# Patient Record
Sex: FEMALE | Race: WHITE
[De-identification: names, ages, dates, MRNs, and addresses within clinical notes are randomized per-mention and may not be internally consistent; named-entity substitution may affect disease eponyms.]

---

## 2019-07-30 ENCOUNTER — HOSPITAL ENCOUNTER (OUTPATIENT)
Dept: HOSPITAL 35 - CV | Age: 24
End: 2019-07-30
Attending: FAMILY MEDICINE
Payer: COMMERCIAL

## 2019-07-30 DIAGNOSIS — J45.990: Primary | ICD-10-CM

## 2019-08-14 ENCOUNTER — HOSPITAL ENCOUNTER (OUTPATIENT)
Dept: HOSPITAL 35 - ULTRA | Age: 24
End: 2019-08-14
Attending: FAMILY MEDICINE
Payer: COMMERCIAL

## 2019-08-14 ENCOUNTER — TRANSFERRED RECORDS (OUTPATIENT)
Dept: HEALTH INFORMATION MANAGEMENT | Facility: CLINIC | Age: 24
End: 2019-08-14

## 2019-08-14 DIAGNOSIS — E04.2: Primary | ICD-10-CM

## 2021-12-06 ENCOUNTER — LAB REQUISITION (OUTPATIENT)
Dept: LAB | Facility: CLINIC | Age: 26
End: 2021-12-06

## 2021-12-06 LAB — HBV SURFACE AB SERPL IA-ACNC: >1000 M[IU]/ML

## 2021-12-06 PROCEDURE — 86481 TB AG RESPONSE T-CELL SUSP: CPT | Performed by: INTERNAL MEDICINE

## 2021-12-06 PROCEDURE — 86765 RUBEOLA ANTIBODY: CPT | Performed by: INTERNAL MEDICINE

## 2021-12-06 PROCEDURE — 86735 MUMPS ANTIBODY: CPT | Performed by: INTERNAL MEDICINE

## 2021-12-06 PROCEDURE — 86762 RUBELLA ANTIBODY: CPT | Performed by: INTERNAL MEDICINE

## 2021-12-06 PROCEDURE — 86706 HEP B SURFACE ANTIBODY: CPT | Performed by: INTERNAL MEDICINE

## 2021-12-06 PROCEDURE — 86787 VARICELLA-ZOSTER ANTIBODY: CPT | Performed by: INTERNAL MEDICINE

## 2021-12-07 LAB
MEV IGG SER IA-ACNC: 173 AU/ML
MEV IGG SER IA-ACNC: POSITIVE
MUMPS ANTIBODY IGG INSTRUMENT VALUE: 41.7 AU/ML
MUV IGG SER QL IA: POSITIVE
RUBV IGG SERPL QL IA: 13 INDEX
RUBV IGG SERPL QL IA: POSITIVE
VZV IGG SER QL IA: 841.4 INDEX
VZV IGG SER QL IA: POSITIVE

## 2021-12-08 LAB
GAMMA INTERFERON BACKGROUND BLD IA-ACNC: 0 IU/ML
M TB IFN-G BLD-IMP: NEGATIVE
M TB IFN-G CD4+ BCKGRND COR BLD-ACNC: 10 IU/ML
MITOGEN IGNF BCKGRD COR BLD-ACNC: 0 IU/ML
MITOGEN IGNF BCKGRD COR BLD-ACNC: 0 IU/ML
QUANTIFERON MITOGEN: 10 IU/ML
QUANTIFERON NIL TUBE: 0 IU/ML
QUANTIFERON TB1 TUBE: 0 IU/ML
QUANTIFERON TB2 TUBE: 0

## 2022-04-03 ENCOUNTER — HEALTH MAINTENANCE LETTER (OUTPATIENT)
Age: 27
End: 2022-04-03

## 2022-06-12 ENCOUNTER — HOSPITAL ENCOUNTER (EMERGENCY)
Facility: CLINIC | Age: 27
Discharge: HOME | End: 2022-06-12
Admitting: PHYSICIAN ASSISTANT
Payer: COMMERCIAL

## 2022-06-12 VITALS
SYSTOLIC BLOOD PRESSURE: 133 MMHG | RESPIRATION RATE: 18 BRPM | HEART RATE: 84 BPM | DIASTOLIC BLOOD PRESSURE: 88 MMHG | WEIGHT: 180 LBS | TEMPERATURE: 97.6 F | BODY MASS INDEX: 28.93 KG/M2 | HEIGHT: 66 IN | OXYGEN SATURATION: 99 %

## 2022-06-12 DIAGNOSIS — S09.90XA MILD CLOSED HEAD INJURY, INITIAL ENCOUNTER: ICD-10-CM

## 2022-06-12 PROCEDURE — 99284 EMERGENCY DEPT VISIT MOD MDM: CPT | Performed by: PHYSICIAN ASSISTANT

## 2022-06-12 PROCEDURE — 96372 THER/PROPH/DIAG INJ SC/IM: CPT | Performed by: PHYSICIAN ASSISTANT

## 2022-06-12 PROCEDURE — 250N000011 HC RX IP 250 OP 636: Performed by: PHYSICIAN ASSISTANT

## 2022-06-12 RX ORDER — KETOROLAC TROMETHAMINE 15 MG/ML
15 INJECTION, SOLUTION INTRAMUSCULAR; INTRAVENOUS ONCE
Status: COMPLETED | OUTPATIENT
Start: 2022-06-12 | End: 2022-06-12

## 2022-06-12 RX ORDER — ONDANSETRON 4 MG/1
4 TABLET, ORALLY DISINTEGRATING ORAL ONCE
Status: COMPLETED | OUTPATIENT
Start: 2022-06-12 | End: 2022-06-12

## 2022-06-12 RX ORDER — ONDANSETRON 4 MG/1
4 TABLET, ORALLY DISINTEGRATING ORAL EVERY 8 HOURS PRN
Qty: 10 TABLET | Refills: 0 | Status: SHIPPED | OUTPATIENT
Start: 2022-06-12 | End: 2022-06-15

## 2022-06-12 RX ORDER — KETOROLAC TROMETHAMINE 15 MG/ML
15 INJECTION, SOLUTION INTRAMUSCULAR; INTRAVENOUS ONCE
Status: DISCONTINUED | OUTPATIENT
Start: 2022-06-12 | End: 2022-06-12 | Stop reason: CLARIF

## 2022-06-12 RX ADMIN — KETOROLAC TROMETHAMINE 15 MG: 15 INJECTION, SOLUTION INTRAMUSCULAR; INTRAVENOUS at 19:45

## 2022-06-12 RX ADMIN — ONDANSETRON 4 MG: 4 TABLET, ORALLY DISINTEGRATING ORAL at 19:43

## 2022-06-12 NOTE — Clinical Note
Zully Correa was seen and treated in our emergency department on 6/12/2022.  She may return to work on 06/13/2022.       If you have any questions or concerns, please don't hesitate to call.      Claudine Ivory PA-C

## 2022-06-12 NOTE — ED PROVIDER NOTES
"ED Provider Note  [facility]      History   No chief complaint on file.    HPI  Zully Correa is an otherwise-healthy 26 year old female who presents to the ED today reporting a head injury. ***    Past Medical History  No past medical history on file.  No past surgical history on file.  No current outpatient medications on file.    Not on File  Family History  No family history on file.  Social History       Past medical history, past surgical history, medications, allergies, family history, and social history were reviewed with the patient. No additional pertinent items.       Review of Systems  {Complete vs limited ROS:[id number]::\"A complete review of systems was performed with pertinent positives and negatives noted in the HPI, and all other systems negative.\"}    Physical Exam      Physical Exam  ***  ED Course      Procedures       {ED Course Selections:857062}              No results found for any visits on 06/12/22.  Medications - No data to display     Assessments & Plan (with Medical Decision Making)   ***    I have reviewed the nursing notes. I have reviewed the findings, diagnosis, plan and need for follow up with the patient.    New Prescriptions    No medications on file       Final diagnoses:   None       --  ***  [facility] EMERGENCY DEPARTMENT  6/12/2022  "

## 2022-06-12 NOTE — ED TRIAGE NOTES
Pt ambulatory to triage. Has hit the right side of her head 2x in the last 24 hours. Once she bumped it on her car, then another on a kitchen counter. Has a hx of concussions/migraines. Comes in today as she has increased pain/pressure at the side, and nausea.

## 2022-06-13 NOTE — DISCHARGE INSTRUCTIONS
Here in the emergency department, have reassuring vital signs and exam findings.  You have a normal neurologic exam here, without any deficits.  We discussed that with your mechanism of injury, findings are not suspicious for any emergent cause of your headache.  I did offer obtaining a head CT scan, and with shared decision-making, we decided on holding off with your reassuring clinical appearance.  You were given Zofran and Toradol here which did improve your symptoms.  Paper prescription for Zofran was given to you, you can use this as needed.  I would recommend using Tylenol and ibuprofen at home.  I would expect your headache to improve within the next 1 to 2 days.  We discussed reasons to return including severe headache, vomiting, fever, any new or worsening symptoms.    Patient has no other questions or concerns at this time.  Red flag signs were addressed, and they were in agreement with the patient care plan provided.

## 2022-06-13 NOTE — ED PROVIDER NOTES
ED Provider Note  [facility]      History     Chief Complaint   Patient presents with     Head Injury     HPI  Zully Correa is a 26 year old female past medical history significant for migraine headaches who presents emergency department today with concern for right-sided headache.  Patient states that over the last 2 days she has had 2 head injuries.  The first head injury was on Friday night, when she was attempting to get into her vehicle.  She states that she hit the right temporal aspect of her scalp on the door frame while trying to get into her car.  This was without any associated changes in vision, vomiting, loss of consciousness, or any other injury.  She notes again on Saturday, she did hit that area again on her countertop when she accidentally sat up too quickly.  Again, no loss of consciousness, vomiting, or other injury.  She denies any open skin or bleeding at the time of these head injuries.  She states that since the initial injury on Friday she has been dealing with some headache localized to the area where she hit her head, this is associated with some nausea.  She denies any associated fevers, chest pain shortness of breath abdominal pain, vomiting or diarrhea.  No concern for pregnancy, last normal history.  About 3 weeks ago.    Patient does have a history of migraines for which she takes Excedrin as needed.  She describes this current headache slightly different from her migraines.  This is not the worst headache of her life, she states this is about a 6 out of 10.  She denies any associated changes in vision today, numbness or tingling of her extremities.  She has been taking ibuprofen, most recently this morning for headache.    Patient states that she did call into work today due to the headache.  She states that her parents prompted her to come in for evaluation this evening.    Past Medical History  No past medical history on file.  No past surgical  "history on file.  ondansetron (ZOFRAN ODT) 4 MG ODT tab      No Known Allergies  Family History  No family history on file.  Social History       Past medical history, past surgical history, medications, allergies, family history, and social history were reviewed with the patient. No additional pertinent items.       Review of Systems  A complete review of systems was performed with pertinent positives and negatives noted in the HPI, and all other systems negative.    Physical Exam   BP: 133/88  Pulse: 84  Temp: 97.6  F (36.4  C)  Resp: 18  Height: 167.6 cm (5' 6\")  Weight: 81.6 kg (180 lb)  SpO2: 99 %  Physical Exam    GENERAL APPEARANCE: The patient is well developed, well appearing, and in no acute distress.  HEAD:  Normocephalic and atraumatic.  No shipley sign or raccoon eyes.  Examination of the scalp reveals no palpable hematoma, no open skin, or other findings to suggest trauma.  EENT: No scleral icterus.  No conjunctival injection is noted.    NECK: Trachea is midline.No lymphadenopathy or tenderness.  No midline cervical tenderness noted.  LUNGS: Breath sounds are equal and clear bilaterally. No wheezes, rhonchi, or rales.  HEART: Regular rate and normal rhythm.  Radial pulses 2+ bilaterally.  ABDOMEN: Soft, flat, and benign. No mass, tenderness, guarding, or rebound.Bowel sounds are present.  EXTREMITIES: No cyanosis, clubbing, or edema.  NEUROLOGIC: No focal sensory or motor deficits are noted.  Cranial nerves II through XII intact.  Rapid altering movements, finger-nose testing intact.   strength 5 out of 5 bilaterally.  Resisted plantar flexion dorsiflexion 5 out of 5 bilaterally.  PSYCHIATRIC: The patient is awake, alert.  Appropriate mood and affect.  SKIN: Warm, dry, and well perfused. Good turgor.  MUSCULOSKELETAL: Patient has no tenderness to palpation at midline of cervical thoracic and lumbar spine.  She has full range of motion of the neck with flexion extension and lateral rotation to each " side.  She reports some mild discomfort in the vicinity of the paraspinal muscles of the cervical spine with lateral bending only.    ED Course           No results found for any visits on 06/12/22.  Medications   ondansetron (ZOFRAN ODT) ODT tab 4 mg (4 mg Oral Given 6/12/22 1943)   ketorolac (TORADOL) injection 15 mg (15 mg Intramuscular Given 6/12/22 1945)        Assessments & Plan (with Medical Decision Making)   This is a 26-year-old female with history of migraines presenting to the ED with concerns for right-sided headache with history of 2 recent minor head injuries over the last few days.  On presentation patient has stable vitals no tachycardia tachypnea or hypoxia here.  She has no deficits noted with neurologic exam.  She has no cervical midline tenderness, or any other findings to suggest other traumatic injury.  The description of her head injuries are both low-energy mechanisms.  I discussed with her that with her physical exam findings, her description of the headache, and known minor head injuries, symptoms not consistent with acute intracranial process.  Patient has no fever, she has no other meningeal findings.  She denies any shortness of breath or chest pain to raise suspicion for other cardiopulmonary process.  We discussed obtaining neuroimaging today, and I did give her the option of obtaining a head CT.  Patient would like to hold off, with shared decision making.  Did recommend trying to address the headache here in the emergency department, patient given 15 Toradol IM as well as 4 Zofran p.o.    On recheck, patient does report significant improvement of her nausea, states that her neck discomfort also seems to be improved.  She continues to have some headache.  I did discuss with her recommendations for Tylenol and ibuprofen at home, Zofran as needed, I did send a prescription to her preferred pharmacy.  Patient was supposed to work tonight, I gave her a work note excusing her from work  tonight.  Discussed with her my expectations that symptoms should be improving, tomorrow, and if she does develop any new or worsening symptoms she returns right away to ER for further evaluation and management.  Patient has no other questions or concerns at this time.  Red flag signs were addressed, and they were in agreement with the patient care plan provided.    I have reviewed the nursing notes. I have reviewed the findings, diagnosis, plan and need for follow up with the patient.    New Prescriptions    ONDANSETRON (ZOFRAN ODT) 4 MG ODT TAB    Take 1 tablet (4 mg) by mouth every 8 hours as needed for nausea       Final diagnoses:   Mild closed head injury, initial encounter       --    [facility] EMERGENCY DEPARTMENT  6/12/2022

## 2022-07-07 ENCOUNTER — LAB (OUTPATIENT)
Dept: LAB | Facility: CLINIC | Age: 27
End: 2022-07-07
Payer: COMMERCIAL

## 2022-07-07 ENCOUNTER — OFFICE VISIT (OUTPATIENT)
Dept: FAMILY MEDICINE | Facility: CLINIC | Age: 27
End: 2022-07-07
Payer: COMMERCIAL

## 2022-07-07 VITALS
TEMPERATURE: 98.1 F | HEIGHT: 66 IN | WEIGHT: 202.8 LBS | RESPIRATION RATE: 12 BRPM | SYSTOLIC BLOOD PRESSURE: 141 MMHG | OXYGEN SATURATION: 98 % | BODY MASS INDEX: 32.59 KG/M2 | DIASTOLIC BLOOD PRESSURE: 88 MMHG | HEART RATE: 79 BPM

## 2022-07-07 DIAGNOSIS — R03.0 ELEVATED BLOOD PRESSURE READING WITHOUT DIAGNOSIS OF HYPERTENSION: ICD-10-CM

## 2022-07-07 DIAGNOSIS — Z00.00 ROUTINE HISTORY AND PHYSICAL EXAMINATION OF ADULT: Primary | ICD-10-CM

## 2022-07-07 DIAGNOSIS — G43.109 MIGRAINE WITH AURA AND WITHOUT STATUS MIGRAINOSUS, NOT INTRACTABLE: ICD-10-CM

## 2022-07-07 DIAGNOSIS — Z00.00 ROUTINE HISTORY AND PHYSICAL EXAMINATION OF ADULT: ICD-10-CM

## 2022-07-07 DIAGNOSIS — Z86.39 HISTORY OF HYPERTHYROIDISM: ICD-10-CM

## 2022-07-07 LAB
CHOLEST SERPL-MCNC: 148 MG/DL
FASTING STATUS PATIENT QL REPORTED: YES
FASTING STATUS PATIENT QL REPORTED: YES
GLUCOSE BLD-MCNC: 97 MG/DL (ref 70–99)
HDLC SERPL-MCNC: 74 MG/DL
LDLC SERPL CALC-MCNC: 62 MG/DL
NONHDLC SERPL-MCNC: 74 MG/DL
T4 FREE SERPL-MCNC: 1.21 NG/DL (ref 0.76–1.46)
TRIGL SERPL-MCNC: 58 MG/DL
TSH SERPL DL<=0.005 MIU/L-ACNC: 0.07 MU/L (ref 0.4–4)

## 2022-07-07 PROCEDURE — 99385 PREV VISIT NEW AGE 18-39: CPT | Performed by: NURSE PRACTITIONER

## 2022-07-07 PROCEDURE — 36415 COLL VENOUS BLD VENIPUNCTURE: CPT | Performed by: PATHOLOGY

## 2022-07-07 PROCEDURE — 80061 LIPID PANEL: CPT | Performed by: PATHOLOGY

## 2022-07-07 PROCEDURE — 99000 SPECIMEN HANDLING OFFICE-LAB: CPT | Performed by: PATHOLOGY

## 2022-07-07 PROCEDURE — 84443 ASSAY THYROID STIM HORMONE: CPT | Performed by: PATHOLOGY

## 2022-07-07 PROCEDURE — 84439 ASSAY OF FREE THYROXINE: CPT | Performed by: PATHOLOGY

## 2022-07-07 PROCEDURE — 84480 ASSAY TRIIODOTHYRONINE (T3): CPT | Mod: 90 | Performed by: PATHOLOGY

## 2022-07-07 PROCEDURE — 82947 ASSAY GLUCOSE BLOOD QUANT: CPT | Performed by: PATHOLOGY

## 2022-07-07 RX ORDER — SUMATRIPTAN 50 MG/1
50 TABLET, FILM COATED ORAL
Qty: 30 TABLET | Refills: 3 | Status: SHIPPED | OUTPATIENT
Start: 2022-07-07 | End: 2023-10-30

## 2022-07-07 RX ORDER — PROPRANOLOL HYDROCHLORIDE 40 MG/1
40 TABLET ORAL DAILY
Qty: 90 TABLET | Refills: 3 | Status: SHIPPED | OUTPATIENT
Start: 2022-07-07 | End: 2023-10-30

## 2022-07-07 ASSESSMENT — ANXIETY QUESTIONNAIRES
1. FEELING NERVOUS, ANXIOUS, OR ON EDGE: SEVERAL DAYS
7. FEELING AFRAID AS IF SOMETHING AWFUL MIGHT HAPPEN: NOT AT ALL
GAD7 TOTAL SCORE: 3
2. NOT BEING ABLE TO STOP OR CONTROL WORRYING: NOT AT ALL
6. BECOMING EASILY ANNOYED OR IRRITABLE: NOT AT ALL
7. FEELING AFRAID AS IF SOMETHING AWFUL MIGHT HAPPEN: NOT AT ALL
GAD7 TOTAL SCORE: 3
8. IF YOU CHECKED OFF ANY PROBLEMS, HOW DIFFICULT HAVE THESE MADE IT FOR YOU TO DO YOUR WORK, TAKE CARE OF THINGS AT HOME, OR GET ALONG WITH OTHER PEOPLE?: NOT DIFFICULT AT ALL
GAD7 TOTAL SCORE: 3
4. TROUBLE RELAXING: SEVERAL DAYS
5. BEING SO RESTLESS THAT IT IS HARD TO SIT STILL: NOT AT ALL
3. WORRYING TOO MUCH ABOUT DIFFERENT THINGS: SEVERAL DAYS

## 2022-07-07 ASSESSMENT — PAIN SCALES - GENERAL: PAINLEVEL: NO PAIN (0)

## 2022-07-07 ASSESSMENT — PATIENT HEALTH QUESTIONNAIRE - PHQ9: SUM OF ALL RESPONSES TO PHQ QUESTIONS 1-9: 3

## 2022-07-07 NOTE — PROGRESS NOTES
ANNUAL WELLNESS EXAM     Today's Date: Jul 7, 2022     Patient Zully Correa 1995 presents to the clinic today for a preventative health visit.         SUBJECTIVE     History of Present Illness:    Zully presents to the clinic today for a routine health exam. She feels in an overall good state of health. She would like to discuss her migraine headaches today. Has had a history of migraine headaches since a teenager. She currently gets 1-2 headaches a week that can last several hours to a full day. She endorses photophobia and fatigue. States that she sometimes will get an aura with her migraines. Will take Excedrin for relief, but admits it does not always provide relief. Other measures for relief consist of resting in a dark environment and cool washcloths to her forehead.   In terms of health maintenance, she verbally expresses being up to date with immunizations and pap. Last pap was 1-2 years ago and WNL. She relocated last year from [place] to [place]. 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   No chest pain, dyspnea, abdominal pain, fevers, unintentional weight loss, dental concerns, or visual disturbances. Blood pressure in clinic was elevated today. Zully admits to just getting off a 12 hour night shift at the hospital where she consumed a lot of caffeine and has not eaten since last night prior to her shift. No history of elevated blood pressures in the past.      No Known Allergies     No current outpatient medications    Past Medical History:   Diagnosis Date     Hyperthyroidism      Intractable chronic migraine without aura      Peripheral autonomic neuropathy in disorders classified elsewhere      Uncomplicated asthma       Family History   Problem Relation Age of Onset     Asthma Mother      Thyroid Disease Mother         thyroid cancer s/p thyroidectomy     Aneurysm  Maternal Grandmother      Diabetes Maternal Grandfather      Cerebrovascular Disease Paternal Grandmother      Cerebrovascular Disease Paternal Grandfather      Unknown/Adopted No family hx of       Do you have a first-degree relative with a history of the following:  A. Cancer of the breast or ovaries - No   B. Heart attack, heart pain, or stroke before the age of 55 - No  C. Unexplained death from drowning or car accident - No  D. Osteoporosis or any other significant bone health concerns - No    Social History     Tobacco Use     Smoking status: Never Smoker     Smokeless tobacco: Never Used   Substance Use Topics     Alcohol use: Yes     Comment: Occasionally     Drug use: Never      History   Sexual Activity     Sexual activity: Yes     Partners: Male     Birth control/ protection: Condom       PHQ 2022   PHQ-9 Total Score 3   Q9: Thoughts of better off dead/self-harm past 2 weeks Not at all      Immunization History   Administered Date(s) Administered     COVID-19,PF,Pfizer 12+ Yrs ( and After) 2022      Health Maintenance Due   Topic Date Due     PREVENTIVE CARE VISIT  Never done     CHLAMYDIA SCREENING  Never done     ADVANCE CARE PLANNING  Never done     DTAP/TDAP/TD IMMUNIZATION (1 - Tdap) Never done     HPV IMMUNIZATION (1 - 2-dose series) Never done     HIV SCREENING  Never done     HEPATITIS C SCREENING  Never done     PAP  Never done     COVID-19 Vaccine (2 - Pfizer series) 2022      Health Maintenance components reviewed - Seasonal Influenza vaccine status is up to date & Covid-19 vaccine status is up to date.    Diet/Exercise: No concerns addressed    LMP of , regular cycles.  0 Para 0. The current method of family planning is condoms.     Review Of Systems:  Skin: negative  Eyes: negative  Ears/Nose/Throat: negative  Respiratory: No shortness of breath, dyspnea on exertion, cough, or hemoptysis  Cardiovascular: negative for, chest pain and lower extremity  "edema  Gastrointestinal: negative  Genitourinary: negative  Musculoskeletal: negative  Neurologic: negative  Psychiatric: negative  Hematologic/Lymphatic/Immunologic: negative  Endocrine: negative         OBJECTIVE     BP (!) 141/88 (BP Location: Right arm, Patient Position: Sitting, Cuff Size: Adult Regular)   Pulse 79   Temp 98.1  F (36.7  C) (Oral)   Resp 12   Ht 1.676 m (5' 6\")   Wt 92 kg (202 lb 12.8 oz)   LMP 06/19/2022 (Exact Date)   SpO2 98%   BMI 32.73 kg/m       Estimated body mass index is 32.73 kg/m  as calculated from the following:    Height as of this encounter: 1.676 m (5' 6\").    Weight as of this encounter: 92 kg (202 lb 12.8 oz).    Physical Exam:  Constitutional: Awake, alert, cooperative, no apparent distress, and appears stated age.  Eyes: Lids and lashes normal, pupils equal, round and reactive to light, extra ocular muscles intact, sclera clear, conjunctiva normal.  ENT: Normocephalic, without obvious abnormality, atramatic, external ears without lesions, oral pharynx with moist mucus membranes, tonsils without erythema or exudates, gums normal and good dentition.  Neck: Supple, symmetrical, trachea midline, no adenopathy, thyroid symmetric, not enlarged and no tenderness, skin normal.  Hematologic / Lymphatic: No cervical lymphadenopathy and no supraclavicular lymphadenopathy.  Lungs: No increased work of breathing, good air exchange, clear to auscultation bilaterally, no crackles or wheezing.  Cardiovascular: Regular rate and rhythm, normal S1 and S2, no S3 or S4, and no murmur noted.  Abdomen: No scars, normal bowel sounds, soft, non-distended, non-tender, no masses palpated, no hepatosplenomegally.  Musculoskeletal: No redness, warmth, or swelling of the joints. FROM noted x4 extremities.  Neurologic: Awake, alert, oriented to name, place and time. Cranial nerves II-XII are grossly intact. Gait is normal.  Neuropsychiatric: Normal affect, mood, orientation, memory and " insight.  Skin: No visible rashes, erythema, pallor, petechia or purpura.         ASSESSMENT/PLAN     (Z00.00) Routine history and physical examination of adult  (primary encounter diagnosis)  Physical exam unremarkable. Screening completed per USPSTF recommendations and per patient's own risk factors. Will check TSH given history of hyperthyroidism.  Plan: Lipid panel reflex to direct LDL Fasting,         Glucose, TSH with free T4 reflex    (G43.109) Migraine with aura and without status migrainosus, not intractable  Given frequency of migraine, feel that patient will benefit from abortive and prophylactic medication management. Patient can continue to use Excedrin as needed or interchangeably with sumatriptan. Neuro exam unremarkable. S/sx not c/w intracranial process. No other acute findings or red flags in migraine symptomatology.   Plan: SUMAtriptan (IMITREX) 50 MG tablet, propranolol        (INDERAL) 40 MG tablet    (R03.0) Elevated blood pressure reading without diagnosis of hypertension  - Likely related to situational factors including recent caffeine intake, fatigue, dehydration, and physical stress  - No chest pain, severe headache, vision changes, or dyspnea      -Discussed/Reinforced healthy diety, lifestyle, exercise and safety.  -Recommended completion of routine dental and eye exam.  -Lab screenings completed today. Results pending.     Follow-Up:  Follow up in one year, or sooner if needed.     Patient engaged in their plan of care. Patient verbalized understanding and agreed with the final plan.  AVS printed and given to patient.    ALLI Cintron CNP   [facility]  [address] [place] [id number] [phone]

## 2022-07-08 LAB — T3 SERPL-MCNC: 101 NG/DL (ref 85–202)

## 2022-07-14 ENCOUNTER — VIRTUAL VISIT (OUTPATIENT)
Dept: FAMILY MEDICINE | Facility: CLINIC | Age: 27
End: 2022-07-14
Payer: COMMERCIAL

## 2022-07-14 DIAGNOSIS — E05.90 SUBCLINICAL HYPERTHYROIDISM: Primary | ICD-10-CM

## 2022-07-14 NOTE — PROGRESS NOTES
Zully is a 26 year old who is being evaluated via a billable telephone visit.      What phone number would you like to be contacted at? [phone]  How would you like to obtain your AVS? Carlos Enriquez   Zully is a 26 year old, presenting for the following health issues:  No chief complaint on file.      HPI     26-year-old female with PMH migraines, hyperthyroidism presents for telephone follow-up to discuss last week's lab results.  Patient's TSH was depressed at 0.07, her T4 Free and T3 total were normal.  When she previously had hyperthyroidism, her symptoms included heat intolerance, anxiety, weight loss.  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 She denies diarrhea. No other acute concerns/symptoms at time of exam.      Review of Systems   Constitutional, HEENT, cardiovascular, pulmonary, gi and gu systems are negative, except as otherwise noted.          Objective           Vitals:  No vitals were obtained today due to virtual visit.    Physical Exam   healthy, alert and no distress  PSYCH: Alert and oriented times 3; coherent speech, normal   rate and volume, able to articulate logical thoughts, able   to abstract reason, no tangential thoughts, no hallucinations   or delusions  Her affect is normal  RESP: No cough, no audible wheezing, able to talk in full sentences  Remainder of exam unable to be completed due to telephone visits    Recent Results (from the past 168 hour(s))   Lipid panel reflex to direct LDL Fasting    Collection Time: 07/07/22  6:00 PM   Result Value Ref Range    Cholesterol 148 <200 mg/dL    Triglycerides 58 <150 mg/dL    Direct Measure HDL 74 >=50 mg/dL    LDL Cholesterol Calculated 62 <=100 mg/dL    Non HDL Cholesterol 74 <130 mg/dL    Patient Fasting > 8hrs? Yes    Glucose    Collection Time: 07/07/22  6:00 PM   Result Value Ref Range    Glucose 97 70 - 99 mg/dL     Patient Fasting > 8hrs? Yes    TSH with free T4 reflex    Collection Time: 07/07/22  6:00 PM   Result Value Ref Range    TSH 0.07 (L) 0.40 - 4.00 mU/L   T4 free    Collection Time: 07/07/22  6:00 PM   Result Value Ref Range    Free T4 1.21 0.76 - 1.46 ng/dL   T3 total    Collection Time: 07/07/22  6:00 PM   Result Value Ref Range    T3 Total 101 85 - 202 ng/dL     Assessment/Plan  1. Subclinical hyperthyroidism  Will check thyrotropin receptor antibody and refer to ENDO given symptomatic hyperthyroidism.  - Thyrotropin Receptor Antibody; Future  - Adult Endocrinology  Referral; Future    Follow-up as needed.    All questions/concerns addressed. Patient stated understanding/agreement to plan of care.    ALLI Hewitt, CNP  [facility] [employer]      Phone call duration: 5:40 minutes

## 2022-07-18 ENCOUNTER — VIRTUAL VISIT (OUTPATIENT)
Dept: ENDOCRINOLOGY | Facility: CLINIC | Age: 27
End: 2022-07-18
Payer: COMMERCIAL

## 2022-07-18 ENCOUNTER — TELEPHONE (OUTPATIENT)
Dept: ENDOCRINOLOGY | Facility: CLINIC | Age: 27
End: 2022-07-18

## 2022-07-18 ENCOUNTER — PRE VISIT (OUTPATIENT)
Dept: ENDOCRINOLOGY | Facility: CLINIC | Age: 27
End: 2022-07-18

## 2022-07-18 DIAGNOSIS — E05.90 SUBCLINICAL HYPERTHYROIDISM: ICD-10-CM

## 2022-07-18 PROCEDURE — 99203 OFFICE O/P NEW LOW 30 MIN: CPT | Mod: 95 | Performed by: INTERNAL MEDICINE

## 2022-07-18 NOTE — LETTER
7/18/2022       RE: Zully Correa  [address]  [place] [address]     Dear Colleague,    Thank you for referring your patient, Zully Correa, to the [facility] ENDOCRINOLOGY CLINIC [place] at [facility]. Please see a copy of my visit note below.    Endocrinology and Diabetes Clinic - New Consult     Zully Correa  is being evaluated via a billable video visit.      How would you like to obtain your AVS? [employer]  For the video visit, send the invitation by: Text to cell phone: [phone]  Will anyone else be joining your video visit? No    CC: Subclinical hyperthyroidism      Subjective:   Zully Correa is a 26 year old female seen today for a new consult. Due to the COVID-19 pandemic today's visit was completed virtually.     She has history of hyperthyroidism diagnosed a few years ago (2019?) in [place]. She is a runner and was experiencing exercise intolerance, thought she had asthma and went in for workup. Labs showed hyperthyroidism (unclear if this was subclinical or overt) and she was started on treatment with methimazole. She did have an uptake and scan and a thyroid ultrasound, which showed non-cancerous nodules, no biopsy recommended. Methimazole was continued for about a year, stopped because labs results had normalized. We do not currently have access to these records.     Symptoms went away after initial diagnosis and treatment, then slowly have come back.     Current symptoms:   Heat intolerance   Anxiety  Insomnia   Maybe some tremor   Heart racing   No eye symptoms   With initial diagnosis lost 10-15 lbs, since then has gained weight and now is stable despite efforts to lose weight   No compressive symptoms, with initial diagnosis she felt a mild goiter but not now     Mother had thyroid cancer and is s/p thyroidectomy. Aunts on both maternal and paternal sides have had thyroid disease (not cancer).  "    She takes propranolol for migraine prevention. No supplements and she does not take biotin.     She is not currently pregnant and does not have plans for pregnancy in the near future.       Current Outpatient Medications   Medication Instructions     propranolol (INDERAL) 40 mg, Oral, DAILY     SUMAtriptan (IMITREX) 50 mg, Oral, AT ONSET OF HEADACHE, May repeat in 2 hours. Max 4 tablets/24 hours.     Past Medical History:   Diagnosis Date     Hyperthyroidism      Intractable chronic migraine without aura      Peripheral autonomic neuropathy in disorders classified elsewhere      Uncomplicated asthma        No past surgical history on file.    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   She and her fiance are planning to get  this year.     Review of Systems: A comprehensive ROS was negative except as noted in HPI.     Physical Examination:  Estimated body mass index is 32.73 kg/m  as calculated from the following:    Height as of 7/7/22: 1.676 m (5' 6\").    Weight as of 7/7/22: 92 kg (202 lb 12.8 oz).    Wt Readings from Last 4 Encounters:   07/07/22 92 kg (202 lb 12.8 oz)   06/12/22 81.6 kg (180 lb)     GENERAL: Healthy, alert and no distress  EYES: Eyes grossly normal to inspection.  No discharge or erythema, or obvious scleral/conjunctival abnormalities.  NECK: No visible goiter or nodules   RESP: No audible wheeze, cough, or visible cyanosis.  Breathing and speaking comfortably. "   SKIN: Visible skin clear. No significant rash, abnormal pigmentation or lesions.  NEURO: Cranial nerves grossly intact.    PSYCH: Mentation appears normal, affect normal/bright, judgement and insight intact, normal speech and appearance well-groomed.    Labs and Studies:   ENDO THYROID LABS-[facility] Latest Ref Rng & Units 7/7/2022   TSH 0.40 - 4.00 mU/L 0.07 (L)   T3 85 - 202 ng/dL 101   FREE T4 0.76 - 1.46 ng/dL 1.21       Assessment:  Subclinical hyperthyroidism. Differential diagnosis includes Graves disease, multinodular goiter, or toxic adenoma.     Plan:   Repeat thyroid function labs and check TSI. There is an order to check thyrotropin receptor antibody already in place through her PCP.   Obtain a radioiodine uptake and scan.   Obtain records from her previous thyroid workup in [place], [place] (Dr. Shola Aquino, [facility]) for review, if possible.   I will contact her once results return. We reviewed treatment options today including methimazole, SOLORIO ablation, and thyroidectomy. To be determined based on results and her preference.     Follow up in clinic in 6 months.              Video-Visit Details    Type of service:  Video Visit    Video Start Time: 11:59 AM    Video End Time: 12:27 PM    Platform used for Video Visit: Sergo Martinez MD   Diabetes and Endocrinology   [phone]

## 2022-07-18 NOTE — TELEPHONE ENCOUNTER
FUTURE VISIT INFORMATION      FUTURE VISIT INFORMATION:    Date: 7/18/2022    Time: 12am    Location: [facility]  REFERRAL INFORMATION:    Referring provider:  Jemal CARSON CNP     Referring providers clinic:  [facility]     Reason for visit/diagnosis  Subclinical Hyperthyroidism    RECORDS REQUESTED FROM:       Clinic name Comments Records Status Imaging Status   Daniel Gilbert-7/14/2022 Epic No Images

## 2022-07-18 NOTE — PROGRESS NOTES
Endocrinology and Diabetes Clinic - New Consult     Zully Correa  is being evaluated via a billable video visit.      How would you like to obtain your AVS? [employer]  For the video visit, send the invitation by: Text to cell phone: [phone]  Will anyone else be joining your video visit? No    CC: Subclinical hyperthyroidism      Subjective:   Zully Correa is a 26 year old female seen today for a new consult. Due to the COVID-19 pandemic today's visit was completed virtually.     She has history of hyperthyroidism diagnosed a few years ago (2019?) in [place]. She is a runner and was experiencing exercise intolerance, thought she had asthma and went in for workup. Labs showed hyperthyroidism (unclear if this was subclinical or overt) and she was started on treatment with methimazole. She did have an uptake and scan and a thyroid ultrasound, which showed non-cancerous nodules, no biopsy recommended. Methimazole was continued for about a year, stopped because labs results had normalized. We do not currently have access to these records.     Symptoms went away after initial diagnosis and treatment, then slowly have come back.     Current symptoms:   Heat intolerance   Anxiety  Insomnia   Maybe some tremor   Heart racing   No eye symptoms   With initial diagnosis lost 10-15 lbs, since then has gained weight and now is stable despite efforts to lose weight   No compressive symptoms, with initial diagnosis she felt a mild goiter but not now     Mother had thyroid cancer and is s/p thyroidectomy. Aunts on both maternal and paternal sides have had thyroid disease (not cancer).     She takes propranolol for migraine prevention. No supplements and she does not take biotin.     She is not currently pregnant and does not have plans for pregnancy in the near future.       Current Outpatient Medications   Medication Instructions     propranolol (INDERAL) 40 mg, Oral, DAILY     SUMAtriptan (IMITREX) 50 mg, Oral,  "AT ONSET OF HEADACHE, May repeat in 2 hours. Max 4 tablets/24 hours.     Past Medical History:   Diagnosis Date     Hyperthyroidism      Intractable chronic migraine without aura      Peripheral autonomic neuropathy in disorders classified elsewhere      Uncomplicated asthma        No past surgical history on file.    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   She and her fiance are planning to get  this year.     Review of Systems: A comprehensive ROS was negative except as noted in HPI.     Physical Examination:  Estimated body mass index is 32.73 kg/m  as calculated from the following:    Height as of 7/7/22: 1.676 m (5' 6\").    Weight as of 7/7/22: 92 kg (202 lb 12.8 oz).    Wt Readings from Last 4 Encounters:   07/07/22 92 kg (202 lb 12.8 oz)   06/12/22 81.6 kg (180 lb)     GENERAL: Healthy, alert and no distress  EYES: Eyes grossly normal to inspection.  No discharge or erythema, or obvious scleral/conjunctival abnormalities.  NECK: No visible goiter or nodules   RESP: No audible wheeze, cough, or visible cyanosis.  Breathing and speaking comfortably.   SKIN: Visible skin clear. No significant rash, abnormal pigmentation or lesions.  NEURO: Cranial nerves grossly intact.    PSYCH: Mentation appears normal, affect normal/bright, judgement and insight intact, normal speech and appearance well-groomed.    Labs and Studies:   ENDO THYROID LABS-[facility] Latest Ref Rng & Units 7/7/2022   TSH " 0.40 - 4.00 mU/L 0.07 (L)   T3 85 - 202 ng/dL 101   FREE T4 0.76 - 1.46 ng/dL 1.21       Assessment:  Subclinical hyperthyroidism. Differential diagnosis includes Graves disease, multinodular goiter, or toxic adenoma.     Plan:   Repeat thyroid function labs and check TSI. There is an order to check thyrotropin receptor antibody already in place through her PCP.   Obtain a radioiodine uptake and scan.   Obtain records from her previous thyroid workup in [place], [place] (Dr. Shola Aquino, [facility]) for review, if possible.   I will contact her once results return. We reviewed treatment options today including methimazole, SOLORIO ablation, and thyroidectomy. To be determined based on results and her preference.     Follow up in clinic in 6 months.              Video-Visit Details    Type of service:  Video Visit    Video Start Time: 11:59 AM    Video End Time: 12:27 PM    Platform used for Video Visit: Sergo Martinez MD   Diabetes and Endocrinology   [phone]

## 2022-07-18 NOTE — PATIENT INSTRUCTIONS
Schedule a Nuclear Medicine thyroid uptake and scan.  Have labs done 1-2 days prior to the appointment.

## 2022-07-28 ENCOUNTER — TELEPHONE (OUTPATIENT)
Dept: ENDOCRINOLOGY | Facility: CLINIC | Age: 27
End: 2022-07-28

## 2022-07-28 NOTE — TELEPHONE ENCOUNTER
----- Message from Funmi Martinez MD sent at 7/28/2022 12:40 PM CDT -----  Regarding: RE: Appointment Not Available  Okay to use SIMRAN for this patient. Thank you!   Funmi     ----- Message -----  From: Michael Gilbert  Sent: 7/27/2022  11:35 AM CDT  To: Funmi Martinez MD  Subject: Appointment Not Available                        Dr. Martinez,    Per your check out orders from 07/18/2022, you requested that this pt be scheduled to see you around 01/18/2023 for a six month follow up. Unfortunately, there are no open slots in this timeframe - the only open slots in your January schedule are SIMRAN. Please advise when able.   ThanksMichael

## 2022-07-28 NOTE — TELEPHONE ENCOUNTER
JF 07/28/2022 for pt to c/b to schedule appointment with Dr. Martinez. SIMRAN approved per Dr. Martinez. There are two slots on 02/10/2023, one at 11:00 and one at 11:30 - either one can be used to schedule this pt. Will have to schedule manually as these slots will not appear in auto search.

## 2022-07-29 NOTE — TELEPHONE ENCOUNTER
[facility] 07/29/2022 for pt to c/b to schedule appointment with Dr. Martinez. SIMRAN approved per Dr. Martinez. There is one slot open on 02/10/2022 at 11:30, this slot can be used to schedule this pt, however pt will need to be scheduled manually as this slot will not appear in auto search.

## 2022-08-02 NOTE — TELEPHONE ENCOUNTER
JF and sent MyChart 8/2/22 for pt to c/b to schedule appointment with Dr. Martinez. SIMRAN approved per Dr. Martinez. Currently there is a SIMRAN open on 2/10/22 @ 11:30 am. Will need to schedule manually - will not populate w/ auto search.

## 2022-10-03 ENCOUNTER — HEALTH MAINTENANCE LETTER (OUTPATIENT)
Age: 27
End: 2022-10-03

## 2022-10-26 PROBLEM — Z00.00 ENCOUNTER FOR PREVENTIVE HEALTH EXAMINATION: Status: ACTIVE | Noted: 2022-10-26

## 2023-03-18 NOTE — NURSING NOTE
Chief Complaint   Patient presents with     Physical     Headache     Had them for a while starting to get the multiple times throughout the week. Causes nausea   AMENA Morris   Dr Reis

## 2023-08-13 ENCOUNTER — HEALTH MAINTENANCE LETTER (OUTPATIENT)
Age: 28
End: 2023-08-13

## 2023-10-30 ENCOUNTER — OFFICE VISIT (OUTPATIENT)
Dept: FAMILY MEDICINE | Facility: CLINIC | Age: 28
End: 2023-10-30
Payer: COMMERCIAL

## 2023-10-30 VITALS
HEIGHT: 66 IN | OXYGEN SATURATION: 99 % | HEART RATE: 92 BPM | TEMPERATURE: 97.5 F | BODY MASS INDEX: 33.27 KG/M2 | DIASTOLIC BLOOD PRESSURE: 80 MMHG | SYSTOLIC BLOOD PRESSURE: 129 MMHG | WEIGHT: 207 LBS | RESPIRATION RATE: 12 BRPM

## 2023-10-30 DIAGNOSIS — Z00.00 ROUTINE HISTORY AND PHYSICAL EXAMINATION OF ADULT: Primary | ICD-10-CM

## 2023-10-30 DIAGNOSIS — G43.709 CHRONIC MIGRAINE WITHOUT AURA WITHOUT STATUS MIGRAINOSUS, NOT INTRACTABLE: ICD-10-CM

## 2023-10-30 DIAGNOSIS — F39 MOOD DISORDER (H): ICD-10-CM

## 2023-10-30 DIAGNOSIS — E05.90 SUBCLINICAL HYPERTHYROIDISM: ICD-10-CM

## 2023-10-30 PROCEDURE — 99000 SPECIMEN HANDLING OFFICE-LAB: CPT | Performed by: NURSE PRACTITIONER

## 2023-10-30 PROCEDURE — 84480 ASSAY TRIIODOTHYRONINE (T3): CPT | Performed by: NURSE PRACTITIONER

## 2023-10-30 PROCEDURE — 83520 IMMUNOASSAY QUANT NOS NONAB: CPT | Mod: 90 | Performed by: NURSE PRACTITIONER

## 2023-10-30 PROCEDURE — 84443 ASSAY THYROID STIM HORMONE: CPT | Performed by: NURSE PRACTITIONER

## 2023-10-30 PROCEDURE — 84445 ASSAY OF TSI GLOBULIN: CPT | Mod: 90 | Performed by: NURSE PRACTITIONER

## 2023-10-30 PROCEDURE — 99395 PREV VISIT EST AGE 18-39: CPT | Performed by: NURSE PRACTITIONER

## 2023-10-30 PROCEDURE — 84439 ASSAY OF FREE THYROXINE: CPT | Performed by: NURSE PRACTITIONER

## 2023-10-30 PROCEDURE — 96127 BRIEF EMOTIONAL/BEHAV ASSMT: CPT | Performed by: NURSE PRACTITIONER

## 2023-10-30 PROCEDURE — 36415 COLL VENOUS BLD VENIPUNCTURE: CPT | Performed by: NURSE PRACTITIONER

## 2023-10-30 PROCEDURE — 99214 OFFICE O/P EST MOD 30 MIN: CPT | Mod: 25 | Performed by: NURSE PRACTITIONER

## 2023-10-30 RX ORDER — ONDANSETRON 4 MG/1
4 TABLET, FILM COATED ORAL EVERY 8 HOURS PRN
Qty: 30 TABLET | Refills: 1 | Status: SHIPPED | OUTPATIENT
Start: 2023-10-30

## 2023-10-30 RX ORDER — RIZATRIPTAN BENZOATE 10 MG/1
10 TABLET ORAL
Qty: 18 TABLET | Refills: 1 | Status: SHIPPED | OUTPATIENT
Start: 2023-10-30 | End: 2023-11-03

## 2023-10-30 RX ORDER — PROPRANOLOL HYDROCHLORIDE 40 MG/1
40 TABLET ORAL DAILY
Qty: 90 TABLET | Refills: 3 | Status: SHIPPED | OUTPATIENT
Start: 2023-10-30

## 2023-10-30 ASSESSMENT — ANXIETY QUESTIONNAIRES
3. WORRYING TOO MUCH ABOUT DIFFERENT THINGS: MORE THAN HALF THE DAYS
GAD7 TOTAL SCORE: 13
IF YOU CHECKED OFF ANY PROBLEMS ON THIS QUESTIONNAIRE, HOW DIFFICULT HAVE THESE PROBLEMS MADE IT FOR YOU TO DO YOUR WORK, TAKE CARE OF THINGS AT HOME, OR GET ALONG WITH OTHER PEOPLE: VERY DIFFICULT
7. FEELING AFRAID AS IF SOMETHING AWFUL MIGHT HAPPEN: MORE THAN HALF THE DAYS
GAD7 TOTAL SCORE: 13
1. FEELING NERVOUS, ANXIOUS, OR ON EDGE: MORE THAN HALF THE DAYS
2. NOT BEING ABLE TO STOP OR CONTROL WORRYING: MORE THAN HALF THE DAYS
5. BEING SO RESTLESS THAT IT IS HARD TO SIT STILL: MORE THAN HALF THE DAYS
6. BECOMING EASILY ANNOYED OR IRRITABLE: SEVERAL DAYS
4. TROUBLE RELAXING: MORE THAN HALF THE DAYS

## 2023-10-30 ASSESSMENT — ENCOUNTER SYMPTOMS
DYSURIA: 0
JOINT SWELLING: 0
FEVER: 0
WEAKNESS: 0
BREAST MASS: 0
DIZZINESS: 0
DIARRHEA: 0
ARTHRALGIAS: 1
PALPITATIONS: 1
CHILLS: 0
CONSTIPATION: 0
COUGH: 0
ABDOMINAL PAIN: 0
FREQUENCY: 0
MYALGIAS: 0
SORE THROAT: 0
PARESTHESIAS: 0
EYE PAIN: 0
HEARTBURN: 1
HEMATURIA: 0
HEADACHES: 1
SHORTNESS OF BREATH: 0
NAUSEA: 0
NERVOUS/ANXIOUS: 1
HEMATOCHEZIA: 0

## 2023-10-30 NOTE — PROGRESS NOTES
"   SUBJECTIVE:   CC: Zully is an 27 year old who presents for preventive health visit.         10/30/2023     3:32 PM   Additional Questions   Roomed by Jose byrd note:  \"She has history of hyperthyroidism diagnosed a few years ago (2019?) in [place]. She is a runner and was experiencing exercise intolerance, thought she had asthma and went in for workup. Labs showed hyperthyroidism (unclear if this was subclinical or overt) and she was started on treatment with methimazole. She did have an uptake and scan and a thyroid ultrasound, which showed non-cancerous nodules, no biopsy recommended. Methimazole was continued for about a year, stopped because labs results had normalized. We do not currently have access to these records\"  Today, she is present for re-evaluation of her TSH and to discuss anxiety: her current symptoms are   Heat intolerance, Anxiety-worsening, she feels always had anxiety, Fatigue; works night shifts, always feel tired, Heart racing, No eye symptoms, gained weight,   No compressive symptoms, with initial diagnosis she felt a mild goiter and now she feels a lump sensation again in throat. A \"egg in throat'; right side. Not painful. No gerd. No difficulty with swallowing. HO cyst to thyroid and that normalized after Methimazole.    Mother had thyroid cancer and is s/p thyroidectomy. Aunts on both maternal and paternal sides have had thyroid disease (not cancer).      She takes propranolol for migraine prevention. No supplements and she does not take biotin. She needs a refill. Can get nauseated with migraine. Pain behind eyes or bridge of nose. Was daily last month, now better. Can be around her period. Does not want to restart Pill.       She is not currently pregnant and does not have plans for pregnancy in the near future.     Has anxiety, this has been chronic. Would like to try something.     Healthy Habits:     Getting at least 3 servings of Calcium per day:  Yes    Bi-annual " eye exam:  NO    Dental care twice a year:  NO    Sleep apnea or symptoms of sleep apnea:  Daytime drowsiness    Diet:  Regular (no restrictions)    Frequency of exercise:  2-3 days/week    Duration of exercise:  30-45 minutes    Taking medications regularly:  Yes    Medication side effects:  Not applicable    Additional concerns today:  Yes  Anxiety  Associated symptoms include arthralgias and headaches. Pertinent negatives include no abdominal pain, chest pain, chills, congestion, coughing, fever, joint swelling, myalgias, nausea, rash, sore throat or weakness.   Headache   Associated symptoms include palpitations. Pertinent negatives include no fever, no shortness of breath and no nausea.       Today's PHQ-2 Score:       10/30/2023     3:12 PM   PHQ-2 ( 1999 [employer])   Q1: Little interest or pleasure in doing things 0   Q2: Feeling down, depressed or hopeless 1   PHQ-2 Score 1   Q1: Little interest or pleasure in doing things Not at all   Q2: Feeling down, depressed or hopeless Several days   PHQ-2 Score 1           7/7/2022     8:51 AM 10/13/2022     9:57 PM 10/30/2023     3:09 PM   MAXX-7 SCORE   Total Score 3 (minimal anxiety) 9 (mild anxiety) 13 (moderate anxiety)   Total Score 3 9    9    9 13       Have you ever done Advance Care Planning? (For example, a Health Directive, POLST, or a discussion with a medical provider or your loved ones about your wishes): No, advance care planning information given to patient to review.  Patient declined advance care planning discussion at this time.    Social History     Tobacco Use    Smoking status: Never    Smokeless tobacco: Never   Substance Use Topics    Alcohol use: Yes     Comment: Occasionally             10/30/2023     3:12 PM   Alcohol Use   Prescreen: >3 drinks/day or >7 drinks/week? No          No data to display              Reviewed orders with patient.  Reviewed health maintenance and updated orders accordingly - Yes  Lab work is in process  Labs reviewed  "in EPIC    Breast Cancer Screening:        10/30/2023     3:13 PM   Breast CA Risk Assessment (FHS-7)   Do you have a family history of breast, colon, or ovarian cancer? No / Unknown         Patient under 40 years of age: Routine Mammogram Screening not recommended.   Pertinent mammograms are reviewed under the imaging tab.    History of abnormal Pap smear: NO - age 21-29 PAP every 3 years recommended     Reviewed and updated as needed this visit by clinical staff   Tobacco  Allergies  Meds              Reviewed and updated as needed this visit by Provider                 Past Medical History:   Diagnosis Date    Hyperthyroidism     Intractable chronic migraine without aura     Peripheral autonomic neuropathy in disorders classified elsewhere     Uncomplicated asthma       No past surgical history on file.    Review of Systems   Constitutional:  Negative for chills and fever.   HENT:  Negative for congestion, ear pain, hearing loss and sore throat.    Eyes:  Negative for pain and visual disturbance.   Respiratory:  Negative for cough and shortness of breath.    Cardiovascular:  Positive for palpitations. Negative for chest pain and peripheral edema.   Gastrointestinal:  Positive for heartburn. Negative for abdominal pain, constipation, diarrhea, hematochezia and nausea.   Breasts:  Negative for tenderness, breast mass and discharge.   Genitourinary:  Negative for dysuria, frequency, genital sores, hematuria, pelvic pain, urgency, vaginal bleeding and vaginal discharge.   Musculoskeletal:  Positive for arthralgias. Negative for joint swelling and myalgias.   Skin:  Negative for rash.   Neurological:  Positive for headaches. Negative for dizziness, weakness and paresthesias.   Psychiatric/Behavioral:  Positive for mood changes. The patient is nervous/anxious.           OBJECTIVE:   /80   Pulse 92   Temp 97.5  F (36.4  C) (Oral)   Resp 12   Ht 1.664 m (5' 5.5\")   Wt 93.9 kg (207 lb)   LMP 10/05/2023 " (Exact Date)   SpO2 99%   BMI 33.92 kg/m    Physical Exam  GENERAL: healthy, alert and no distress  NECK: no adenopathy and thyroid nodule right side-  MS: no gross musculoskeletal defects noted, no edema  SKIN: no suspicious lesions or rashes  NEURO: Normal strength and tone, mentation intact and speech normal  PSYCH: mentation appears normal, affect normal/bright        ASSESSMENT/PLAN:       ICD-10-CM    1. Routine history and physical examination of adult  Z00.00       2. Subclinical hyperthyroidism  E05.90 Thyroid stimulating immunoglobulin     T3, total     Thyrotropin Receptor Antibody     T4, free     TSH     NM Thyroid Uptake and Scan     Thyroid stimulating immunoglobulin     T3, total     Thyrotropin Receptor Antibody     T4, free     TSH     Adult Endocrinology  Referral      3. Chronic migraine without aura without status migrainosus, not intractable  G43.709 propranolol (INDERAL) 40 MG tablet     ondansetron (ZOFRAN) 4 MG tablet     DISCONTINUED: rizatriptan (MAXALT) 10 MG tablet      4. Mood disorder (H24)  F39 sertraline (ZOLOFT) 50 MG tablet        - She never got the labs done by her last VV with Endo. I reordered them today, I told her she will probably need to follow up with Endo regarding the results. Other option can be an E-Consult. She will need to look into her insurance to assess how it is covered. I discussed that I typically do not manage these type of labs nor Hyperthyroid.     - Propranolol reordered since she feels this med is helpful. She does not want to split up the dose; such as 10 mg TID.     - Anxiety; multifactorial potentially with TSH levels, but it appears chronic. In a stressful job. Options discussed and she wants to go on a med that is more weight neutral. Zoloft ordered, and I discussed starting at 1/2 tablet for at least one week and can increase to 50 mg if not noticing any difference. I told her sometimes pts don't need more than a small amount. She  verbalized understanding.     Patient has been advised of split billing requirements and indicates understanding: Yes      COUNSELING:  Reviewed preventive health counseling, as reflected in patient instructions        She reports that she has never smoked. She has never used smokeless tobacco.          ALLI Lopez [facility]  Answers submitted by the patient for this visit:  MAXX-7 (Submitted on 10/30/2023)  MAXX 7 TOTAL SCORE: 13

## 2023-10-31 LAB
T3 SERPL-MCNC: 134 NG/DL (ref 85–202)
T4 FREE SERPL-MCNC: 1.48 NG/DL (ref 0.9–1.7)
TSH SERPL DL<=0.005 MIU/L-ACNC: 0.07 UIU/ML (ref 0.3–4.2)

## 2023-11-01 LAB — TSH RECEP AB SER-ACNC: <1.1 IU/L (ref 0–1.75)

## 2023-11-02 ENCOUNTER — TELEPHONE (OUTPATIENT)
Dept: FAMILY MEDICINE | Facility: CLINIC | Age: 28
End: 2023-11-02
Payer: COMMERCIAL

## 2023-11-02 PROBLEM — E05.90 SUBCLINICAL HYPERTHYROIDISM: Status: ACTIVE | Noted: 2023-11-02

## 2023-11-02 PROBLEM — G43.109 MIGRAINE WITH AURA AND WITHOUT STATUS MIGRAINOSUS, NOT INTRACTABLE: Status: ACTIVE | Noted: 2023-11-02

## 2023-11-02 LAB — TSI SER-ACNC: <1 TSI INDEX

## 2023-11-02 NOTE — TELEPHONE ENCOUNTER
"S-(situation):   Patients anxiety worsened after meds from appt on 10/30/2023    B-(background):   2-3 weeks ago started, anxiety so bad can't go to work  Trigger for this starting -work, upcoming move in a few months,  does not think she offloads stress well  Took medication last 2 days made so much worse, sertraline and propranolol  These meds have made it worse -\"Not herself\" exacerbated per     A-(assessment):   Patient did not make the phone call she was in background and gave permission for  to talk   was very emotional, had to stop and compose himself and is just wanting his wife to be ok.     R-(recommendations):      RN let patient know about how the medications work that were prescribed.    Recommended not taking them at same time. To help differentiate what medication is doing what.    RN let patient and  know that the RN triage line is open 24/7 at the clinic number.    RN made video appt for patient to have medication discussion and possible change tomorrow (patient preference).    FAIZAN Melgar  [facility]     "

## 2023-11-03 ENCOUNTER — VIRTUAL VISIT (OUTPATIENT)
Dept: FAMILY MEDICINE | Facility: CLINIC | Age: 28
End: 2023-11-03
Payer: COMMERCIAL

## 2023-11-03 DIAGNOSIS — F41.1 GAD (GENERALIZED ANXIETY DISORDER): ICD-10-CM

## 2023-11-03 DIAGNOSIS — G43.109 MIGRAINE WITH AURA AND WITHOUT STATUS MIGRAINOSUS, NOT INTRACTABLE: Primary | ICD-10-CM

## 2023-11-03 PROCEDURE — 99214 OFFICE O/P EST MOD 30 MIN: CPT | Mod: VID | Performed by: NURSE PRACTITIONER

## 2023-11-03 RX ORDER — HYDROXYZINE HYDROCHLORIDE 25 MG/1
25 TABLET, FILM COATED ORAL 3 TIMES DAILY PRN
Qty: 30 TABLET | Refills: 0 | Status: SHIPPED | OUTPATIENT
Start: 2023-11-03 | End: 2023-12-29

## 2023-11-03 RX ORDER — RIZATRIPTAN BENZOATE 10 MG/1
10 TABLET ORAL
Qty: 18 TABLET | Refills: 1 | Status: SHIPPED | OUTPATIENT
Start: 2023-11-03

## 2023-11-03 NOTE — PROGRESS NOTES
"Zully is a 27 year old who is being evaluated via a billable video visit.      How would you like to obtain your AVS? MyChart  If the video visit is dropped, the invitation should be resent by: Send to e-mail at: [email or web address]  Will anyone else be joining your video visit? No          Assessment & Plan     Migraine with aura and without status migrainosus, not intractable  Patient reports is not available at the pharmacy.  I am attempting to resend to see if it was not received.  Discussed may be a prior Auth issue.  Patient has previously tried Imitrex without success..    - rizatriptan (MAXALT) 10 MG tablet; Take 1 tablet (10 mg) by mouth at onset of headache for migraine (10 mg as a single dose; if symptoms persist or return, may repeat dose after =2 hours.) May repeat in 2 hours. Max 3 tablets/24 hours.    MAXX (generalized anxiety disorder)  Decrease sertraline as this may be contributing to anxiety. I recommend 25 mg (1/2 tab) for 2 weeks.    Also sending in hydroxyzine for break through anxiety. Discussed this may make drowsy.  Do half dose 12.5 mg if significantly drowsy from 25.  Can take 25 to 50 mg if not too drowsy.    - hydrOXYzine (ATARAX) 25 MG tablet; Take 1 tablet (25 mg) by mouth 3 times daily as needed for anxiety             BMI:   Estimated body mass index is 33.92 kg/m  as calculated from the following:    Height as of 10/30/23: 1.664 m (5' 5.5\").    Weight as of 10/30/23: 93.9 kg (207 lb).           ALLI ARAGON CNP  [facility]    Subjective   Zully is a 27 year old, presenting for the following health issues:  Recheck Medication and Mental Health Problem (Follow up )      11/3/2023     9:48 AM   Additional Questions   Roomed by AMENA VILLELA       History of Present Illness       Reason for visit:  Mental health and medicatoin check      Feeling more anxious since starting propranolol and zoloft.     No confusion, minimally tachycardic/heart " racing not worse     Had to work last night. Works overnights as nurse     Recently stopped therapy. Not interested in referral today.    Has tried imitrex previously for migraines and not helpful. Has been getting a couple times awake. Had tried propranolol previously but was out of medication            Review of Systems         Objective           Vitals:  No vitals were obtained today due to virtual visit.    Physical Exam   GENERAL: Healthy, alert and no distress  EYES: Eyes grossly normal to inspection.  No discharge or erythema, or obvious scleral/conjunctival abnormalities.  RESP: No audible wheeze, cough, or visible cyanosis.  No visible retractions or increased work of breathing.    SKIN: Visible skin clear. No significant rash, abnormal pigmentation or lesions.  NEURO: Cranial nerves grossly intact.  Mentation and speech appropriate for age.  PSYCH: mentation appears normal and anxious                Video-Visit Details    Type of service:  Video Visit     Originating Location (pt. Location): Home    Distant Location (provider location):  On-site  Platform used for Video Visit: Sergo

## 2023-11-06 ENCOUNTER — TELEPHONE (OUTPATIENT)
Dept: FAMILY MEDICINE | Facility: CLINIC | Age: 28
End: 2023-11-06
Payer: COMMERCIAL

## 2023-11-06 NOTE — TELEPHONE ENCOUNTER
Can patient make a OV or VV with me? Change in status since we last talked. I also want to see if she is okay with E-consult.     ALLI Lopez CNP

## 2023-11-06 NOTE — TELEPHONE ENCOUNTER
11/06/23  Forms/Letter Request    Type of form/letter:  Letter to take a leave of absence from work for a month, starting 11/06 or 11/07 for mental health and anxiety    Have you been seen for this request: Yes 10/30/23    Do we have the form/letter: No, needs to be created    When is form/letter needed by: as soon as possible    How would you like the form/letter returned:     Patient Notified form requests are processed in 3-5 business days:Yes    Could we send this information to you in [employer] or would you prefer to receive a phone call?:   Patient would prefer a phone call   Okay to leave a detailed message?: Yes at Cell number on file:    Telephone Information:   Mobile [phone]

## 2023-11-07 NOTE — TELEPHONE ENCOUNTER
Left voice message for patient to callback.    Please assist patient in scheduling an In person office visit or Virtual Visit for documentation regarding a leave of absence.     Can use any open appointment on Friday or any day next week that works for patient.

## 2023-11-08 NOTE — RESULT ENCOUNTER NOTE
Jose Luis Andrea    I will like you to follow up with Endocrinology. You can go outside of [facility] if you are having a hard time getting in to be seen. Also, please get on my schedule for VV so I can try to assist you further with your increased Anxiety. I am definitely here to help you however I can.     Myra

## 2023-11-10 NOTE — TELEPHONE ENCOUNTER
Spouse called back and was informed pt needed an appt.  Was assisted in setting up appt for Mon 11/13/2023 with Dede Spears.

## 2023-11-13 ENCOUNTER — OFFICE VISIT (OUTPATIENT)
Dept: FAMILY MEDICINE | Facility: CLINIC | Age: 28
End: 2023-11-13
Payer: COMMERCIAL

## 2023-11-13 VITALS
HEIGHT: 66 IN | BODY MASS INDEX: 33.59 KG/M2 | DIASTOLIC BLOOD PRESSURE: 72 MMHG | OXYGEN SATURATION: 98 % | RESPIRATION RATE: 12 BRPM | HEART RATE: 58 BPM | SYSTOLIC BLOOD PRESSURE: 112 MMHG | WEIGHT: 209 LBS | TEMPERATURE: 98.4 F

## 2023-11-13 DIAGNOSIS — F41.9 ANXIETY: ICD-10-CM

## 2023-11-13 DIAGNOSIS — E05.90 SUBCLINICAL HYPERTHYROIDISM: ICD-10-CM

## 2023-11-13 DIAGNOSIS — G47.9 SLEEPING DIFFICULTIES: Primary | ICD-10-CM

## 2023-11-13 DIAGNOSIS — F41.0 PANIC ATTACK: ICD-10-CM

## 2023-11-13 PROCEDURE — 96127 BRIEF EMOTIONAL/BEHAV ASSMT: CPT | Performed by: NURSE PRACTITIONER

## 2023-11-13 PROCEDURE — 99214 OFFICE O/P EST MOD 30 MIN: CPT | Performed by: NURSE PRACTITIONER

## 2023-11-13 PROCEDURE — 99207 E-CONSULT TO ENDOCRINOLOGY (ADULT OUTPT PROVIDER TO SPECIALIST WRITTEN QUESTION & RESPONSE): CPT | Performed by: NURSE PRACTITIONER

## 2023-11-13 RX ORDER — GABAPENTIN 100 MG/1
100 CAPSULE ORAL AT BEDTIME
Qty: 30 CAPSULE | Refills: 1 | Status: SHIPPED | OUTPATIENT
Start: 2023-11-13 | End: 2024-01-29

## 2023-11-13 ASSESSMENT — ANXIETY QUESTIONNAIRES
GAD7 TOTAL SCORE: 17
2. NOT BEING ABLE TO STOP OR CONTROL WORRYING: NEARLY EVERY DAY
7. FEELING AFRAID AS IF SOMETHING AWFUL MIGHT HAPPEN: NEARLY EVERY DAY
5. BEING SO RESTLESS THAT IT IS HARD TO SIT STILL: SEVERAL DAYS
4. TROUBLE RELAXING: NEARLY EVERY DAY
GAD7 TOTAL SCORE: 17
IF YOU CHECKED OFF ANY PROBLEMS ON THIS QUESTIONNAIRE, HOW DIFFICULT HAVE THESE PROBLEMS MADE IT FOR YOU TO DO YOUR WORK, TAKE CARE OF THINGS AT HOME, OR GET ALONG WITH OTHER PEOPLE: EXTREMELY DIFFICULT
3. WORRYING TOO MUCH ABOUT DIFFERENT THINGS: NEARLY EVERY DAY
6. BECOMING EASILY ANNOYED OR IRRITABLE: SEVERAL DAYS
1. FEELING NERVOUS, ANXIOUS, OR ON EDGE: NEARLY EVERY DAY

## 2023-11-13 ASSESSMENT — ENCOUNTER SYMPTOMS
AGITATION: 0
CONFUSION: 0
DECREASED CONCENTRATION: 1
CONSTITUTIONAL NEGATIVE: 1
NERVOUS/ANXIOUS: 1
SLEEP DISTURBANCE: 1

## 2023-11-13 ASSESSMENT — PATIENT HEALTH QUESTIONNAIRE - PHQ9
10. IF YOU CHECKED OFF ANY PROBLEMS, HOW DIFFICULT HAVE THESE PROBLEMS MADE IT FOR YOU TO DO YOUR WORK, TAKE CARE OF THINGS AT HOME, OR GET ALONG WITH OTHER PEOPLE: EXTREMELY DIFFICULT
SUM OF ALL RESPONSES TO PHQ QUESTIONS 1-9: 11
SUM OF ALL RESPONSES TO PHQ QUESTIONS 1-9: 11

## 2023-11-13 NOTE — PROGRESS NOTES
"  Assessment & Plan     Sleeping difficulties    - gabapentin (NEURONTIN) 100 MG capsule  Dispense: 30 capsule; Refill: 1  - Adult E-Consult to Endocrinology (Outpt Provider to Specialist Written Question & Response)    Subclinical hyperthyroidism    - Adult E-Consult to Endocrinology (Outpt Provider to Specialist Written Question & Response)    Panic attack    - Adult Mental Health  Referral  - Adult E-Consult to Endocrinology (Outpt Provider to Specialist Written Question & Response)    Anxiety    - Adult Mental Health  Referral  - Adult E-Consult to Endocrinology (Outpt Provider to Specialist Written Question & Response)    - she will continue Zoloft 25 mg per day. She can increase to 50 mg in 3-4 weeks if she wants to reassess if this is potentially more effective.   - she will go to EAP and  ordered as well. Conservative ways discussed.   - She can take 1/2 tablet of Propranolol BID for migraine prevention and panic attacks. She did not like to take 40 mg tablet once a day, this was ordered because this was her previous dose. Other plan is to decrease to 10 mg BID to TID, this will need to be ordered. Can Zakaz.uahart message me.   - sleep difficulties noted related to worry. She can start Gabapentin 100 mg PRN or every night if Propranolol does not help.   - E-consult for Endo approved by patient. Will complete this request since I think her panic attacks might be potentially multifactorial with her thyroid. Will reassess from Endo what they feel the appropriate plan is moving forward. I also ordered the Nuclear uptake test on 10-30. She is aware to get the test completed.   - [place] paperwork completed with patient today. She works as an ICU RN.              BMI:   Estimated body mass index is 34.25 kg/m  as calculated from the following:    Height as of this encounter: 1.664 m (5' 5.5\").    Weight as of this encounter: 94.8 kg (209 lb).   Weight management plan: Discussed healthy diet and " exercise guidelines    Depression Screening Follow Up        11/13/2023     4:00 PM   PHQ   PHQ-9 Total Score 11   Q9: Thoughts of better off dead/self-harm past 2 weeks Not at all         11/13/2023     4:00 PM   Last PHQ-9   1.  Little interest or pleasure in doing things 1   2.  Feeling down, depressed, or hopeless 1   3.  Trouble falling or staying asleep, or sleeping too much 3   4.  Feeling tired or having little energy 3   5.  Poor appetite or overeating 1   6.  Feeling bad about yourself 1   7.  Trouble concentrating 0   8.  Moving slowly or restless 1   Q9: Thoughts of better off dead/self-harm past 2 weeks 0   PHQ-9 Total Score 11         10/13/2022     9:57 PM 10/30/2023     3:09 PM 11/13/2023     4:02 PM   MAXX-7 SCORE   Total Score 9 (mild anxiety) 13 (moderate anxiety) 17 (severe anxiety)   Total Score 9    9    9 13 17         Follow Up Actions Taken  Patient counseled, no additional follow up at this time.  Depression Action Plan reviewed with patient.  Mental Health Referral placed  Adjusted patient's anti-depressant medication.         ALLI Lopez [facility]   Zully is a 27 year old, presenting for the following health issues:  Consult ([place] paper work) and Recheck Medication      11/13/2023     4:02 PM   Additional Questions   Roomed by Jose   Accompanied by        HPI     - she was having panic attacks about 5 per day since 11-2-2023 but has slowly gotten better. Her anxiety has been chronic, even in her teenage years. She has noted an increase since October. She works in the ICU as an RN and is noticing an inability to focus and provide safe patient care related to her anxiety. She is afraid she will make an error. She enjoys her job and will like to get back to work, but feels she needs time to allow her medications to work. She missed work since 10-. She has talked to her manager and will start EAP through her  "work. I believe she works at the [employer] ICU.     - Tsh abnormal. No new changes since our last visit.     Review of Systems   Constitutional: Negative.    Psychiatric/Behavioral:  Positive for decreased concentration and sleep disturbance. Negative for agitation, behavioral problems, confusion, self-injury and suicidal ideas. The patient is nervous/anxious.             Objective    /72   Pulse 58   Temp 98.4  F (36.9  C) (Oral)   Resp 12   Ht 1.664 m (5' 5.5\")   Wt 94.8 kg (209 lb)   LMP 10/05/2023 (Exact Date)   SpO2 98%   BMI 34.25 kg/m    Body mass index is 34.25 kg/m .  Physical Exam  Constitutional:       Appearance: Normal appearance.   Musculoskeletal:      Right lower leg: No edema.      Left lower leg: No edema.   Skin:     General: Skin is warm.      Capillary Refill: Capillary refill takes less than 2 seconds.   Neurological:      General: No focal deficit present.      Mental Status: She is alert and oriented to person, place, and time.   Psychiatric:         Mood and Affect: Mood normal.         Behavior: Behavior normal.         Thought Content: Thought content normal.         Judgment: Judgment normal.            Office Visit on 10/30/2023   Component Date Value Ref Range Status    Thyroid Stim Immunog 10/30/2023 <1.0  <=1.3 TSI index Final       Test Performed by:  [facility]  [address] [place] [address]  : Bradley Patel M.D. Ph.D.; CLIA# [id number]    T3 Total 10/30/2023 134  85 - 202 ng/dL Final    Thyrotropin Receptor Antibody 10/30/2023 <1.10  0.00 - 1.75 IU/L Final       -------------------ADDITIONAL INFORMATION-------------------  At a decision limit of 1.75 IU/L, this assay   has 97% sensitivity and 99% specificity for   detection of Graves' disease. In healthy   individuals and in patients with thyroid disease   without diagnosis of Graves' disease, the upper   limit of anti-TSHR values are 1.22 " IU/L and   1.58 IU/L, respectively (97.5th percentiles).     Test Performed by:  [facility]  [address] [place] [address]  : Bradley Patel M.D. Ph.D.; CLIA# [id number]    Free T4 10/30/2023 1.48  0.90 - 1.70 ng/dL Final    TSH 10/30/2023 0.07 (L)  0.30 - 4.20 uIU/mL Final                     Answers submitted by the patient for this visit:  Patient Health Questionnaire (Submitted on 11/13/2023)  If you checked off any problems, how difficult have these problems made it for you to do your work, take care of things at home, or get along with other people?: Extremely difficult  PHQ9 TOTAL SCORE: 11  MAXX-7 (Submitted on 11/13/2023)  MAXX 7 TOTAL SCORE: 17

## 2023-11-14 ENCOUNTER — E-CONSULT (OUTPATIENT)
Dept: ENDOCRINOLOGY | Facility: CLINIC | Age: 28
End: 2023-11-14
Payer: COMMERCIAL

## 2023-11-14 PROCEDURE — 99451 NTRPROF PH1/NTRNET/EHR 5/>: CPT

## 2023-11-14 NOTE — PROGRESS NOTES
"    11/14/2023     E-Consult has been accepted.    Interprofessional consultation requested by:  Dede Spears APRN CNP      Clinical Question/Purpose: MY CLINICAL QUESTION IS: was seen last year for VV by Endo, but did not follow up with labs or imaging. Now she is noticing a golf ball size lump in throat, panic attacks, and anxiety. She stated she has chronic anxiety but is dealing with panic attacks so FMLA paperwork was initiated today. She was placed on Zoloft, Propranolol, and Gabapentin, but what would be the next appropriate step for her Thyroid? Would starting Methimazole be appropriate?     Patient assessment and information reviewed:   Patient was triaged for urgent endocrine consult in July 2022 and she saw Dr Funmi Martinez  7/18/22 .  She did not get the ordered tests then and she pearson snot have follow up with Dr Martinez scheduled.     7/7/2022 weight 202, HR 79/minute, TSH 0.07, free T4 1.21, T3 101  10/30/23 weight 209, HR 58/minute  TSH 0.07, free t4 1.48, t3 134, TSI < 1, TRAB < 1.10    Images are not on the EMR  8/14/10 thyroid US ([facility])  Right lobe  nodule 2.2 x 2.3 x 2.8 mixed cystic/isoechoic solid; significant hypervascularity  Left nodule 0.9 x 1.4 x 1 cm ; significant vascularity  8/19/2019 123I thyroid uptake/scan: 24 hour uptake 24%; marked asymmetry right >> left  with more avid isotope uptake right .  \"The recent thyroid US demonstrated fairly large heterogeneous predominant solid nodule in the right lobe; smaller solid nodule left lobe\" --     Impression:   Thyrotoxicosis has likely been present since at least 2019 based on incomplete available records.  Nodular thyroid was seen in 2019 with functional imaging suggesting possible toxic multinodular goiter or toxic adenoma (but bilateral per outside reports)  Care everywhere is not open.      What would be the next appropriate step for her Thyroid? Would starting Methimazole be appropriate?   Recommendations:  She should schedule " follow up with Dr Martinez now. ([phone] to schedule)  I see you ordered the thyroid uptake/scan which should be done prior to considering treatment .  If you start methimazole after that study, she should still see Dr Michelle SMITH.  Methimazole dose should be no higher than 5 mg/day and with teaching on methimazole  (see attached).  Methimazole should not be given if pregnant .  Get the 2019 images pushed to PACS so they can be reviewed.    Open Care everywhere so the past endocrine related details can be seen (if there) or else get the relevant record sent to our system.     Information for patients on Tapazole or Propylthiouracil (PTU)  The generic name for Tapazole is methimazole.      The drugs, Tapazole and PTU are used to treat an overactive thyroid (hyperthyroidism).      They prevent the thyroid from making too much thyroid hormone.  You can think of them as putting up a road block in your thyroid.    These drugs are usually well tolerated and safe, but rarely can cause serious side effects.  The two worst potential side-effects are:  agranulocytosis.  This is the loss of the white blood cells that fight infection.  This can occur in approximately 1 in 200 people.  liver problems.  This is even more rare than agranulocytosis but it can be very serious.    Because of the serious nature of the rare side-effects, you should notify your doctor if you develop the following symptoms while taking the drug:  Fever  sore throat  flu-like symptoms (nausea, vomiting, diarrhea, aches, abdominal pain)    In addition, anytime you have evidence of infection, you should get a blood test to be sure that you do not have agranulocytosis.  A prescription will be provided to you to get this blood test as needed.  This is an extra precaution and the results should be available the same day the blood test is drawn.  If your blood count is OK (which it almost always is), then you may continue to take the antithyroid drug.    While  taking this medication, your doctor will probably want to see you frequently, every 1-2 months, at least for a time.  This is important so that the response can be monitored and the dose can be adjusted.        The recommendations provided in this E-Consult are based on a review of clinical data pertinent to the clinical question presented, without a review of the patient's complete medical record or, the benefit of a comprehensive in-person or virtual patient evaluation. This consultation should not replace the clinical judgement and evaluation of the provider ordering this E-Consult. Any new clinical issues, or changes in patient status since the filing of this E-Consult will need to be taken into account when assessing these recommendations. Please contact me if you have further questions.    My total time spent reviewing clinical information and formulating assessment was 15 minutes.        Patrica Owen MD

## 2023-11-17 ENCOUNTER — TELEPHONE (OUTPATIENT)
Dept: ADMINISTRATIVE | Facility: OTHER | Age: 28
End: 2023-11-17

## 2023-11-17 NOTE — TELEPHONE ENCOUNTER
Faxed FLMA FORMS  Confirmed on RightFax  ID: [id number]      [facility]  Date: 11/14/2023  Time: 4:49 PM  Fax: [phone]    Jose LAURA

## 2023-11-20 ENCOUNTER — TELEPHONE (OUTPATIENT)
Dept: FAMILY MEDICINE | Facility: CLINIC | Age: 28
End: 2023-11-20

## 2023-11-20 NOTE — TELEPHONE ENCOUNTER
I left a message for patient regarding E-consult and I don't see that she made any appt for test (uptake test) and consult (MD Martinez).     We need 2019 images and any other information.       Per note:  Recommendations:  -She should schedule follow up with Dr Martinez now. ([phone] to schedule)    -I see you ordered the thyroid uptake/scan which should be done prior to considering treatment     - Get the 2019 images pushed to PACS so they can be reviewed.      - Open Care everywhere so the past endocrine related details can be seen (if there) or else get the relevant record sent to our system.       Also, how is her mental health?     Thanks     Myra Spears

## 2023-11-20 NOTE — LETTER
Dede Spears, APRN CNP     Recommendations:  Please schedule a follow up with Dr Martinez, at [phone] moving forward for future treatments.      Please, schedule lab appoint for testing your thyroid, before any medication changes are made for treatment.  New scans of your thyroid need to be taken and updated to your [facility] record.      Dede Bennett would also like you to have the 2019 PACS images released to [facility] records, to be reviewed for continued care.      Dede Spears would also like you to schedule a Follow/up appointment with her to discuss your current mental health.  ([facility] Schedule number is [phone])        Thanks      Myra Spears

## 2023-11-28 NOTE — TELEPHONE ENCOUNTER
Left voicemail for patient to return call to clinic. When patient returns call, please give them below message.    Will try and call back to relay Spears recommendations, letter has been composed with the recommendation and sent to the patient as well.    Jose LAURA

## 2023-12-14 ENCOUNTER — TELEPHONE (OUTPATIENT)
Dept: FAMILY MEDICINE | Facility: CLINIC | Age: 28
End: 2023-12-14
Payer: COMMERCIAL

## 2023-12-14 NOTE — TELEPHONE ENCOUNTER
"12/14/23  Forms/Letter Request    Type of form/letter:  Short term disability    Have you been seen for this request: N/A    Do we have the form/letter: Yes: in [place] folder    When is form/letter needed by: \"within 3 business days from the date of this letter\" (12/19/23)    How would you like the form/letter returned: Fax [phone]     "

## 2023-12-15 ENCOUNTER — TELEPHONE (OUTPATIENT)
Dept: ADMINISTRATIVE | Facility: OTHER | Age: 28
End: 2023-12-15

## 2023-12-15 NOTE — TELEPHONE ENCOUNTER
Left voicemail for patient to return call to clinic. When patient returns call, please give them below message.    Tory has FMLA paper in her basket but has not finished yet.     Will try to contact patient later.    Jose LAURA

## 2023-12-19 ENCOUNTER — TELEPHONE (OUTPATIENT)
Dept: ADMINISTRATIVE | Facility: OTHER | Age: 28
End: 2023-12-19

## 2023-12-19 NOTE — TELEPHONE ENCOUNTER
HILARIO Andrea    I got her notice about wanting to extend FMLA. My thought is her symptoms could be multifactoral with her Thyroid function.I don't see that she followed up with Dr Martinez ([phone] to schedule) or did the thyroid uptake/scan. Is there a reason why this has not been completed?     We also need 2019 images pushed to PACS so they can be reviewed and get the relevant record sent to our system.     Myra

## 2023-12-19 NOTE — TELEPHONE ENCOUNTER
Left voicemail for patient to return call to clinic. When patient returns call, please give them below message.    I get directed to voice mail will try and call the patient back to let them know Tory is still working on their [facility] paper work.    Jose LAURA

## 2023-12-29 DIAGNOSIS — F41.1 GAD (GENERALIZED ANXIETY DISORDER): ICD-10-CM

## 2023-12-29 RX ORDER — HYDROXYZINE HYDROCHLORIDE 25 MG/1
25 TABLET, FILM COATED ORAL 3 TIMES DAILY PRN
Qty: 30 TABLET | Refills: 0 | Status: SHIPPED | OUTPATIENT
Start: 2023-12-29 | End: 2024-01-30

## 2023-12-29 NOTE — TELEPHONE ENCOUNTER
"12/29/23  Forms/Letter Request    Type of form/letter:  Short Term Disability    Have you been seen for this request: N/A    Do we have the form/letter: Yes: in [place] folder    When is form/letter needed by: \"within 3 business days from the date of this letter\"  Letter is dated 12/14/23    How would you like the form/letter returned: Fax [phone]     "

## 2023-12-29 NOTE — TELEPHONE ENCOUNTER
I did router her first set of FMLA paper work that was fax in 11/14/2023, and I routed the scan copy on 11/24/2023, to the new fax number.    There is another set of FMLA paper in Tory's box, but she will not be back to look at it until 01/02/2024.    Jose LAURA

## 2024-01-09 ENCOUNTER — TELEPHONE (OUTPATIENT)
Dept: ADMINISTRATIVE | Facility: OTHER | Age: 29
End: 2024-01-09

## 2024-01-09 NOTE — TELEPHONE ENCOUNTER
Forms Faxed  Confirmed on RightFax for 30 days  ID: [id number]  [employer] Short Term Disability Claim Form    Date: 01/09/2024  Time: 2:24 PM  Fax: [phone]    Jose LAURA

## 2024-01-29 DIAGNOSIS — G47.9 SLEEPING DIFFICULTIES: ICD-10-CM

## 2024-01-29 RX ORDER — GABAPENTIN 100 MG/1
100 CAPSULE ORAL AT BEDTIME
Qty: 30 CAPSULE | Refills: 0 | Status: SHIPPED | OUTPATIENT
Start: 2024-01-29 | End: 2024-03-05

## 2024-01-29 NOTE — TELEPHONE ENCOUNTER
Refill Request  Medication name: Pending Prescriptions:                       Disp   Refills    gabapentin (NEURONTIN) 100 MG capsule     30 cap*1            Sig: Take 1 capsule (100 mg) by mouth at bedtime    Requested Pharmacy:  [id number] - [place], [place] - [address] [facility] [address]

## 2024-01-30 ENCOUNTER — VIRTUAL VISIT (OUTPATIENT)
Dept: FAMILY MEDICINE | Facility: CLINIC | Age: 29
End: 2024-01-30
Payer: COMMERCIAL

## 2024-01-30 DIAGNOSIS — R94.6 ABNORMAL FINDING ON THYROID FUNCTION TEST: ICD-10-CM

## 2024-01-30 DIAGNOSIS — F41.1 GAD (GENERALIZED ANXIETY DISORDER): Primary | ICD-10-CM

## 2024-01-30 DIAGNOSIS — G43.109 MIGRAINE WITH AURA AND WITHOUT STATUS MIGRAINOSUS, NOT INTRACTABLE: ICD-10-CM

## 2024-01-30 DIAGNOSIS — F39 MOOD DISORDER (H): ICD-10-CM

## 2024-01-30 PROCEDURE — 99214 OFFICE O/P EST MOD 30 MIN: CPT | Mod: 95 | Performed by: NURSE PRACTITIONER

## 2024-01-30 PROCEDURE — 96127 BRIEF EMOTIONAL/BEHAV ASSMT: CPT | Mod: 95 | Performed by: NURSE PRACTITIONER

## 2024-01-30 RX ORDER — HYDROXYZINE HYDROCHLORIDE 25 MG/1
25 TABLET, FILM COATED ORAL 3 TIMES DAILY PRN
Qty: 90 TABLET | Refills: 3 | Status: SHIPPED | OUTPATIENT
Start: 2024-01-30

## 2024-01-30 RX ORDER — SERTRALINE HYDROCHLORIDE 100 MG/1
100 TABLET, FILM COATED ORAL DAILY
Qty: 90 TABLET | Refills: 1 | Status: SHIPPED | OUTPATIENT
Start: 2024-01-30 | End: 2024-08-26

## 2024-01-30 ASSESSMENT — ANXIETY QUESTIONNAIRES
7. FEELING AFRAID AS IF SOMETHING AWFUL MIGHT HAPPEN: MORE THAN HALF THE DAYS
GAD7 TOTAL SCORE: 17
7. FEELING AFRAID AS IF SOMETHING AWFUL MIGHT HAPPEN: MORE THAN HALF THE DAYS
1. FEELING NERVOUS, ANXIOUS, OR ON EDGE: NEARLY EVERY DAY
5. BEING SO RESTLESS THAT IT IS HARD TO SIT STILL: SEVERAL DAYS
6. BECOMING EASILY ANNOYED OR IRRITABLE: MORE THAN HALF THE DAYS
2. NOT BEING ABLE TO STOP OR CONTROL WORRYING: NEARLY EVERY DAY
8. IF YOU CHECKED OFF ANY PROBLEMS, HOW DIFFICULT HAVE THESE MADE IT FOR YOU TO DO YOUR WORK, TAKE CARE OF THINGS AT HOME, OR GET ALONG WITH OTHER PEOPLE?: EXTREMELY DIFFICULT
4. TROUBLE RELAXING: NEARLY EVERY DAY
IF YOU CHECKED OFF ANY PROBLEMS ON THIS QUESTIONNAIRE, HOW DIFFICULT HAVE THESE PROBLEMS MADE IT FOR YOU TO DO YOUR WORK, TAKE CARE OF THINGS AT HOME, OR GET ALONG WITH OTHER PEOPLE: EXTREMELY DIFFICULT
3. WORRYING TOO MUCH ABOUT DIFFERENT THINGS: NEARLY EVERY DAY
GAD7 TOTAL SCORE: 17

## 2024-01-30 ASSESSMENT — COLUMBIA-SUICIDE SEVERITY RATING SCALE - C-SSRS
6. HAVE YOU EVER DONE ANYTHING, STARTED TO DO ANYTHING, OR PREPARED TO DO ANYTHING TO END YOUR LIFE?: NO
1. WITHIN THE PAST MONTH, HAVE YOU WISHED YOU WERE DEAD OR WISHED YOU COULD GO TO SLEEP AND NOT WAKE UP?: NO
2. IN THE PAST MONTH, HAVE YOU ACTUALLY HAD ANY THOUGHTS OF KILLING YOURSELF?: NO

## 2024-01-30 ASSESSMENT — ENCOUNTER SYMPTOMS: NERVOUS/ANXIOUS: 1

## 2024-01-30 ASSESSMENT — PATIENT HEALTH QUESTIONNAIRE - PHQ9
10. IF YOU CHECKED OFF ANY PROBLEMS, HOW DIFFICULT HAVE THESE PROBLEMS MADE IT FOR YOU TO DO YOUR WORK, TAKE CARE OF THINGS AT HOME, OR GET ALONG WITH OTHER PEOPLE: EXTREMELY DIFFICULT
SUM OF ALL RESPONSES TO PHQ QUESTIONS 1-9: 19
SUM OF ALL RESPONSES TO PHQ QUESTIONS 1-9: 19

## 2024-01-30 NOTE — PROGRESS NOTES
Zully is a 28 year old who is being evaluated via a billable video visit.      How would you like to obtain your AVS? MyChart  If the video visit is dropped, the invitation should be resent by: Send to e-mail at: [email or web address]  Will anyone else be joining your video visit? No        Assessment & Plan   Patient needs paper work regarding return to work. She is planning on returning on 2/15. I think it would be reasonable to return to work with shorter hours at first to see how anxiety symptoms are when working.    Mood disorder (H24)  I think we should increase dose. Patient is currently on 50 mg and not feeling significantly better. I think it is reasonable to increase dose to 100 mg. I think we should go slowly and do 75 mg for 1-2 weeks and then increase to 100 mg    Discussed suicidal ideation. No active plan. Patient feels safe at home and has support at home.    - sertraline (ZOLOFT) 100 MG tablet; Take 1 tablet (100 mg) by mouth daily for 180 days  - Adult Mental [facility]  Referral; Future    MAXX (generalized anxiety disorder)  Refilling hydroxyzine.  - Adult Mental [facility]  Referral; Future  - hydrOXYzine HCl (ATARAX) 25 MG tablet; Take 1 tablet (25 mg) by mouth 3 times daily as needed for anxiety    Abnormal finding on thyroid function test  It appears previous order is  for thyroid imaging. I will reorder.    Encouraged patient to schedule this imaging as well as follow up with endocrine. Phone  number given for both imaging and endocrine.    Patient has struggled thus far with scheduling appts due to severe anxiety. I discussed with patient that anxiety could be being made worse by abnormal thyroid so it would be really important to have this looked at.    - NM Thyroid Uptake and Scan; Future    Migraine with aura and without status migrainosus, not intractable  Patient is already on propranolol for prevention of migraine. I think in terms of next step for abortive  medication would be work up and discussion with neurology regarding other available agents.    - Adult Neurology  Referral; Future            Depression Screening Follow Up        1/30/2024    10:39 AM   PHQ   PHQ-9 Total Score 19   Q9: Thoughts of better off dead/self-harm past 2 weeks More than half the days   F/U: Thoughts of suicide or self-harm Yes   F/U: Self harm-plan No   F/U: Self-harm action No   F/U: Safety concerns No               1/30/2024     1:22 PM   C-SSRS (Brief [place])   Within the last month, have you wished you were dead or wished you could go to sleep and not wake up? No   Within the last month, have you had any actual thoughts of killing yourself? No   Within the last month, have you ever done anything, started to do anything, or prepared to do anything to end your life? No       Follow Up    Follow Up Actions Taken  Crisis resource information provided in the After Visit Summary  Mental Health Referral placed    Discussed the following ways the patient can remain in a safe environment:  be around others    Patient will be sending in paperwork regarding mental health and anxiety for me to fill out.   Subjective   Zully is a 28 year old, presenting for the following health issues:  Anxiety ([email or web address])    Anxiety    History of Present Illness       Mental Health Follow-up:  Patient presents to follow-up on Depression & Anxiety.Patient's depression since last visit has been:  Medium  The patient is not having other symptoms associated with depression.  Patient's anxiety since last visit has been:  Medium  The patient is not having other symptoms associated with anxiety.  Any significant life events: No  Patient is feeling anxious or having panic attacks.  Patient has no concerns about alcohol or drug use.    Headaches:   Since the patient's last clinic visit, headaches are: worsened  The patient is getting headaches:  A few times a week  She is not able to do normal  "daily activities when she has a migraine.  The patient is taking the following rescue/relief medications:  Excedrin and Maxalt   Patient states \"The relief is inconsistent\" from the rescue/relief medications.   The patient is taking the following medications to prevent migraines:  Other  In the past 4 weeks, the patient has gone to an Urgent Care or Emergency Room 0 times times due to headaches.    She eats 2-3 servings of fruits and vegetables daily.She consumes 1 sweetened beverage(s) daily.She exercises with enough effort to increase her heart rate 30 to 60 minutes per day.  She exercises with enough effort to increase her heart rate 4 days per week.   She is taking medications regularly.     Hydroxyzine helpful but made very drowsy. Is helpful at night to sleep.    Patient feels like mental health symptoms are improving. She does not yet feel 100% but is close. Patient plans to return to work on 2/15. Patient works 12 hours overnight. She is decreasing work hours to not work as may days. Needs paperwork to be able to return to work.    Migraines not responding to maxalt. Patient wondering if there are any other treatment options.                  Objective           Vitals:  No vitals were obtained today due to virtual visit.    Physical Exam   GENERAL: alert and no distress  EYES: Eyes grossly normal to inspection.  No discharge or erythema, or obvious scleral/conjunctival abnormalities.  RESP: No audible wheeze, cough, or visible cyanosis.    SKIN: Visible skin clear. No significant rash, abnormal pigmentation or lesions.  NEURO: Cranial nerves grossly intact.  Mentation and speech appropriate for age.  PSYCH: Appropriate affect, tone, and pace of words          Video-Visit Details    Type of service:  Video Visit     Originating Location (pt. Location): Home    Distant Location (provider location):  On-site  Platform used for Video Visit: Sergo  Signed Electronically by: ALLI ARAGON CNP    "

## 2024-02-06 ENCOUNTER — TELEPHONE (OUTPATIENT)
Dept: FAMILY MEDICINE | Facility: CLINIC | Age: 29
End: 2024-02-06
Payer: COMMERCIAL

## 2024-02-06 NOTE — TELEPHONE ENCOUNTER
I have completed paperwork for Zully to pick-up here at the clinic. I have the paperwork at my desk. Please advise if she wants it mailed, faxed, or placed at the  for pick-up.    FAIZAN Canela

## 2024-02-07 ENCOUNTER — MYC MEDICAL ADVICE (OUTPATIENT)
Dept: INTERNAL MEDICINE | Facility: CLINIC | Age: 29
End: 2024-02-07
Payer: COMMERCIAL

## 2024-02-09 NOTE — TELEPHONE ENCOUNTER
Forms were faxed to the provided number and sent to be scanned into patients chart.    FAIZAN Canela

## 2024-03-05 DIAGNOSIS — G47.9 SLEEPING DIFFICULTIES: ICD-10-CM

## 2024-03-05 RX ORDER — GABAPENTIN 100 MG/1
100 CAPSULE ORAL AT BEDTIME
Qty: 30 CAPSULE | Refills: 0 | Status: SHIPPED | OUTPATIENT
Start: 2024-03-05 | End: 2024-04-15

## 2024-03-05 NOTE — TELEPHONE ENCOUNTER
Refill Request  Medication name: Pending Prescriptions:                       Disp   Refills    gabapentin (NEURONTIN) 100 MG capsule     30 cap*0            Sig: Take 1 capsule (100 mg) by mouth at bedtime    Requested Pharmacy:  [id number] - [place], [place] - [address] [facility] [address]

## 2024-04-15 DIAGNOSIS — G47.9 SLEEPING DIFFICULTIES: ICD-10-CM

## 2024-04-15 RX ORDER — GABAPENTIN 100 MG/1
100 CAPSULE ORAL AT BEDTIME
Qty: 30 CAPSULE | Refills: 0 | Status: SHIPPED | OUTPATIENT
Start: 2024-04-15

## 2024-04-15 NOTE — TELEPHONE ENCOUNTER
Refill request for the following medication(s):  Pending Prescriptions:                       Disp   Refills    gabapentin (NEURONTIN) 100 MG capsule     30 cap*0            Sig: Take 1 capsule (100 mg) by mouth at bedtime      Pharmacy: [id number] - [place], [place] - [address] [facility] [address]

## 2024-07-09 ENCOUNTER — TELEPHONE (OUTPATIENT)
Dept: FAMILY MEDICINE | Facility: CLINIC | Age: 29
End: 2024-07-09

## 2024-07-09 DIAGNOSIS — Z11.1 TUBERCULOSIS SCREENING: Primary | ICD-10-CM

## 2024-07-09 NOTE — TELEPHONE ENCOUNTER
"Please call patient back to schedule after order is placed.    Patient was scheduled without an order/incorrectly by central scheduling for a mantoux skin test. I spoke with patient and apologized as we do not have \"walk in\" appointments for mantoux and Tb gold lab draws. I also explained that a Tb gold blood draw would be more efficient for her as she will not have to come back into clinic for a skin test to be read. The patient states that she would like to request an order for a blood draw(needed for school) and would like a call back ASAP to schedule when the order is in. I will pend and send to PCP/Grabiel solano. We will cancel the current [facility] appointment that was scheduled incorrectly at .     Last seen by PCP:    11/13/2023  [facility] Dede Quinn APRN CNP  Family Medicine       Scheduling error:  Event Tracking Log   Event Name User Date & Time Comments   Appointment Scheduled URBAN GIRARD 7/9/24 1:19 PM Tb skin test      Conner Mcgee CMA    "

## 2024-07-10 ENCOUNTER — LAB (OUTPATIENT)
Dept: LAB | Facility: CLINIC | Age: 29
End: 2024-07-10
Attending: NURSE PRACTITIONER
Payer: COMMERCIAL

## 2024-07-10 DIAGNOSIS — Z11.1 TUBERCULOSIS SCREENING: ICD-10-CM

## 2024-07-10 PROCEDURE — 86481 TB AG RESPONSE T-CELL SUSP: CPT

## 2024-07-10 PROCEDURE — 36415 COLL VENOUS BLD VENIPUNCTURE: CPT

## 2024-07-12 ENCOUNTER — TELEPHONE (OUTPATIENT)
Dept: FAMILY MEDICINE | Facility: CLINIC | Age: 29
End: 2024-07-12
Payer: COMMERCIAL

## 2024-07-12 LAB
GAMMA INTERFERON BACKGROUND BLD IA-ACNC: 0.02 IU/ML
M TB IFN-G BLD-IMP: NEGATIVE
M TB IFN-G CD4+ BCKGRND COR BLD-ACNC: 9.98 IU/ML
MITOGEN IGNF BCKGRD COR BLD-ACNC: 0 IU/ML
MITOGEN IGNF BCKGRD COR BLD-ACNC: 0.01 IU/ML
QUANTIFERON MITOGEN: 10 IU/ML
QUANTIFERON NIL TUBE: 0.02 IU/ML
QUANTIFERON TB1 TUBE: 0.02 IU/ML
QUANTIFERON TB2 TUBE: 0.03

## 2024-07-12 NOTE — TELEPHONE ENCOUNTER
Patient will be picking up forms and see another provider to have them filled out.  Forms will be placed up front.  Tory did not have open appoint that was in her needed time frame.    Jose LAURA

## 2024-07-15 ENCOUNTER — TRANSCRIBE ORDERS (OUTPATIENT)
Dept: OTHER | Age: 29
End: 2024-07-15

## 2024-07-15 DIAGNOSIS — E05.90 HYPERTHYROIDISM: Primary | ICD-10-CM

## 2024-08-26 DIAGNOSIS — F39 MOOD DISORDER (H): ICD-10-CM

## 2024-08-26 RX ORDER — SERTRALINE HYDROCHLORIDE 100 MG/1
100 TABLET, FILM COATED ORAL DAILY
Qty: 90 TABLET | Refills: 0 | Status: SHIPPED | OUTPATIENT
Start: 2024-08-26

## 2024-10-05 ENCOUNTER — HEALTH MAINTENANCE LETTER (OUTPATIENT)
Age: 29
End: 2024-10-05

## 2025-02-12 ENCOUNTER — TELEPHONE (OUTPATIENT)
Dept: ENDOCRINOLOGY | Facility: CLINIC | Age: 30
End: 2025-02-12
Payer: COMMERCIAL

## 2025-02-12 NOTE — TELEPHONE ENCOUNTER
Called pt to let them know that Dr. Martinez will be on Medical leave and her 3/7 appt needs to be rescheduled. No answer. LVM advising pt to call back to discuss new appt date. Smart Balloon Message sent.

## 2025-06-27 DIAGNOSIS — F41.1 GAD (GENERALIZED ANXIETY DISORDER): ICD-10-CM

## 2025-06-30 RX ORDER — HYDROXYZINE HYDROCHLORIDE 25 MG/1
25 TABLET, FILM COATED ORAL 3 TIMES DAILY PRN
Qty: 30 TABLET | Refills: 0 | Status: SHIPPED | OUTPATIENT
Start: 2025-06-30